# Patient Record
Sex: FEMALE | Race: WHITE | Employment: OTHER | URBAN - METROPOLITAN AREA
[De-identification: names, ages, dates, MRNs, and addresses within clinical notes are randomized per-mention and may not be internally consistent; named-entity substitution may affect disease eponyms.]

---

## 2017-06-05 PROBLEM — I10 ESSENTIAL HYPERTENSION: Status: ACTIVE | Noted: 2017-06-05

## 2017-06-05 PROBLEM — K21.9 GASTROESOPHAGEAL REFLUX DISEASE: Status: ACTIVE | Noted: 2017-06-05

## 2017-06-05 PROBLEM — E55.9 VITAMIN D DEFICIENCY: Status: ACTIVE | Noted: 2017-06-05

## 2019-06-20 ENCOUNTER — TELEPHONE (OUTPATIENT)
Dept: CARDIOLOGY | Age: 64
End: 2019-06-20

## 2019-06-20 NOTE — TELEPHONE ENCOUNTER
TRIED UNSUCCESSFULLY TO REACH PATIENT. WILL CALL DR. POOLE'S OFFICE TO OBTAIN A DIFFERENT PHONE NUMBER POSSIBLY.

## 2019-07-31 ENCOUNTER — HOSPITAL ENCOUNTER (OUTPATIENT)
Dept: CARDIOLOGY | Age: 64
Discharge: HOME | End: 2019-07-31
Payer: MEDICARE

## 2019-07-31 DIAGNOSIS — I27.20 PULMONARY HTN (HCC): ICD-10-CM

## 2019-07-31 LAB
LV EF: 65 %
LVEF MODALITY: NORMAL

## 2019-07-31 PROCEDURE — 93306 TTE W/DOPPLER COMPLETE: CPT

## 2021-02-11 ENCOUNTER — IMMUNIZATION (OUTPATIENT)
Dept: PRIMARY CARE CLINIC | Age: 66
End: 2021-02-11
Payer: MEDICARE

## 2021-02-11 PROCEDURE — 91300 COVID-19, PFIZER VACCINE 30MCG/0.3ML DOSE: CPT | Performed by: NURSE PRACTITIONER

## 2021-02-11 PROCEDURE — 0001A COVID-19, PFIZER VACCINE 30MCG/0.3ML DOSE: CPT | Performed by: NURSE PRACTITIONER

## 2021-03-04 ENCOUNTER — IMMUNIZATION (OUTPATIENT)
Dept: PRIMARY CARE CLINIC | Age: 66
End: 2021-03-04
Payer: MEDICARE

## 2021-03-04 PROCEDURE — 0002A COVID-19, PFIZER VACCINE 30MCG/0.3ML DOSE: CPT | Performed by: NURSE PRACTITIONER

## 2021-03-04 PROCEDURE — 91300 COVID-19, PFIZER VACCINE 30MCG/0.3ML DOSE: CPT | Performed by: NURSE PRACTITIONER

## 2022-02-23 ENCOUNTER — OFFICE VISIT (OUTPATIENT)
Dept: RHEUMATOLOGY | Age: 67
End: 2022-02-23
Payer: MEDICARE

## 2022-02-23 VITALS
RESPIRATION RATE: 16 BRPM | HEIGHT: 63 IN | WEIGHT: 129 LBS | OXYGEN SATURATION: 99 % | BODY MASS INDEX: 22.86 KG/M2 | SYSTOLIC BLOOD PRESSURE: 110 MMHG | DIASTOLIC BLOOD PRESSURE: 70 MMHG

## 2022-02-23 DIAGNOSIS — I73.00 RAYNAUD'S PHENOMENON WITHOUT GANGRENE: ICD-10-CM

## 2022-02-23 DIAGNOSIS — Z51.81 MEDICATION MONITORING ENCOUNTER: ICD-10-CM

## 2022-02-23 DIAGNOSIS — M34.9 LIMITED SCLERODERMA (HCC): Primary | ICD-10-CM

## 2022-02-23 DIAGNOSIS — E78.2 MIXED HYPERLIPIDEMIA: ICD-10-CM

## 2022-02-23 DIAGNOSIS — I25.10 CORONARY ARTERY DISEASE INVOLVING NATIVE CORONARY ARTERY OF NATIVE HEART WITHOUT ANGINA PECTORIS: ICD-10-CM

## 2022-02-23 DIAGNOSIS — M34.9 LIMITED SCLERODERMA (HCC): ICD-10-CM

## 2022-02-23 DIAGNOSIS — I10 ESSENTIAL HYPERTENSION: ICD-10-CM

## 2022-02-23 LAB — SEDIMENTATION RATE, ERYTHROCYTE: 32 MM/HR (ref 0–20)

## 2022-02-23 PROCEDURE — G8484 FLU IMMUNIZE NO ADMIN: HCPCS | Performed by: INTERNAL MEDICINE

## 2022-02-23 PROCEDURE — 99205 OFFICE O/P NEW HI 60 MIN: CPT | Performed by: INTERNAL MEDICINE

## 2022-02-23 PROCEDURE — 1036F TOBACCO NON-USER: CPT | Performed by: INTERNAL MEDICINE

## 2022-02-23 PROCEDURE — G8427 DOCREV CUR MEDS BY ELIG CLIN: HCPCS | Performed by: INTERNAL MEDICINE

## 2022-02-23 PROCEDURE — 3017F COLORECTAL CA SCREEN DOC REV: CPT | Performed by: INTERNAL MEDICINE

## 2022-02-23 PROCEDURE — 1123F ACP DISCUSS/DSCN MKR DOCD: CPT | Performed by: INTERNAL MEDICINE

## 2022-02-23 PROCEDURE — G8420 CALC BMI NORM PARAMETERS: HCPCS | Performed by: INTERNAL MEDICINE

## 2022-02-23 PROCEDURE — 1090F PRES/ABSN URINE INCON ASSESS: CPT | Performed by: INTERNAL MEDICINE

## 2022-02-23 PROCEDURE — G8400 PT W/DXA NO RESULTS DOC: HCPCS | Performed by: INTERNAL MEDICINE

## 2022-02-23 PROCEDURE — 4040F PNEUMOC VAC/ADMIN/RCVD: CPT | Performed by: INTERNAL MEDICINE

## 2022-02-23 RX ORDER — HYDROXYCHLOROQUINE SULFATE 200 MG/1
200 TABLET, FILM COATED ORAL DAILY
COMMUNITY

## 2022-02-23 ASSESSMENT — ENCOUNTER SYMPTOMS
ABDOMINAL PAIN: 0
TROUBLE SWALLOWING: 0
DIARRHEA: 0
COLOR CHANGE: 1
COUGH: 0
NAUSEA: 0
VOMITING: 0
SHORTNESS OF BREATH: 1

## 2022-02-23 NOTE — PROGRESS NOTES
Kaylie Cobb 1955 is a 74 y.o. 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 )         ASSESSMENT/PLAN:    Kaylie Cobb 1955 is a 74 y.o. female seen in consult for Raynaud's and possibly limited scleroderma. 1. 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 She does have pretty significant Raynaud's which is relatively well controlled on nifedipine 30 mg twice daily but, does have a history of right fourth digit ulceration with some tissue loss. She does get some heartburn and rare trouble swallowing. She has some sclerodactyly but only distal to the DIP joints. She has some telangiectasias on the face. She has not had calcinosis. She has had some shortness of breath of late. She has no known history of ILD or pulmonary hypertension. I will need to get further lab work-up as below but pending further work-up I anticipate we will ultimately want high resolution CT scan of the chest to evaluate for ILD, echocardiogram to evaluate for pulmonary hypertension, and GI referral. She likely has some component of inflammatory arthritis as well. She can continue Plaquenil 200 mg daily. 2. Medication monitoring-she is overdue for a Plaquenil eye exam.    3. CAD status post stenting-she will be making an appointment with her cardiologist as she has been having more shortness of breath lately. Would avoid systemic NSAIDs. 4. Hyperlipidemia-patient's with systemic connective tissue diseases have an increased cardiovascular risk. She is already on a statin. 5. Hypertension-she is on nifedipine which also doubles as a treatment for Raynaud's. 1. Limited scleroderma ([facility] [address]  -     Miscellaneous sendout 3; Future  -     Miscellaneous Sendout; Future  -     PATRICIA; Future  -     C3 Complement; Future  -     C4 Complement; Future  -     Sjogren's Ab (SS-A, SS-B);  Future  - Anti-RNP (OBDULIA Ab); Future  -     Melgar (OBDULIA) Antibody IgG; Future  -     Anti-DNA Antibody, Double-Stranded; Future  -     Urinalysis; Future  -     Anti-Scleroderma Antibody; Future  -     CK; Future  -     C-Reactive Protein; Future  -     Sedimentation Rate; Future  -     Cyclic Citrul Peptide Antibody, IgG; Future  -     Rheumatoid Factor; Future  -     Electrophoresis Protein, Serum; Future  2. Raynaud's phenomenon without gangrene  -     Miscellaneous sendout 3; Future  -     Miscellaneous Sendout; Future  -     PATRICIA; Future  -     C3 Complement; Future  -     C4 Complement; Future  -     Sjogren's Ab (SS-A, SS-B); Future  -     Anti-RNP (OBDULIA Ab); Future  -     Melgar (OBDULIA) Antibody IgG; Future  -     Anti-DNA Antibody, Double-Stranded; Future  -     Urinalysis; Future  -     Anti-Scleroderma Antibody; Future  -     CK; Future  -     C-Reactive Protein; Future  -     Sedimentation Rate; Future  -     Cyclic Citrul Peptide Antibody, IgG; Future  -     Rheumatoid Factor; Future  -     Electrophoresis Protein, Serum; Future  3. Medication monitoring encounter  4. Mixed hyperlipidemia  5. Coronary artery disease involving native coronary artery of native heart without angina pectoris  6. Essential hypertension      Return in about 4 weeks (around 3/23/2022). Subjective   SUBJECTIVE/OBJECTIVE:    HPI: Pastor Gerard 1955 is a 74 y.o. female seen in consult for Raynaud's and possibly limited scleroderma. Patient was previously following with rheumatology at the [facility]. She was last seen over a year ago. She was being treated for Raynaud's. She actually had a pretty significant digital ulcer and lost some of the tip of her right fourth finger a few years back. She is currently on nifedipine 30 mg twice daily and has not had any recent digital pitting or ulceration. There was also some question at the [facility] of crest syndrome.  She does appear to have some sclerodactyly distal to the PIP joints only. She has some telangiectasias on the face. She rarely has issues with swallowing but does get heartburn. She has not had calcinosis. She has been having a little more shortness of breath lately. She has a history of CAD status post stenting. She has no known history of interstitial lung disease or pulmonary hypertension. She does get some joint pain particularly in the hands. She is on Plaquenil 200 mg daily. She is due for an eye exam.    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 HENT: Negative for mouth sores and trouble swallowing. Respiratory: Positive for shortness of breath. Negative for cough. Cardiovascular: Negative for chest pain. Gastrointestinal: Negative for abdominal pain, diarrhea, nausea and vomiting. Genitourinary: Negative for dysuria and hematuria. Musculoskeletal: Positive for arthralgias. Negative for joint swelling. Skin: Positive for color change. Negative for rash. Neurological: Negative for weakness and numbness. Hematological: Negative for adenopathy. All other systems reviewed and are negative. Objective   Vitals:    02/23/22 1501   BP: 110/70   Resp: 16   SpO2: 99%      Physical Exam  Constitutional:       General: She is not in acute distress. Appearance: Normal appearance. HENT:      Head: Normocephalic and atraumatic. Right Ear: External ear normal.      Left Ear: External ear normal.      Nose: Nose normal.   Eyes:      General: No scleral icterus. Pulmonary:      Effort: Pulmonary effort is normal.   Musculoskeletal:         General: Tenderness present. No swelling or deformity.       Comments: She is tender in several PIP joints. Are diffusely puffy. Skin:     General: Skin is warm and dry. Findings: No rash. Comments: Fingers are puffy and there is sclerodactyly but only distal to the DIP joints. There is no obvious skin thickening elsewhere. She has evidence of tissue loss at the right fourth digit but no current digital pitting or ulceration. Neurological:      General: No focal deficit present. Mental Status: She is alert and oriented to person, place, and time. Mental status is at baseline. Psychiatric:         Mood and Affect: Mood normal.         Behavior: Behavior normal.            Lab Results   Component Value Date    WBC 11.1 09/05/2015    HGB 11.9 09/05/2015    HCT 36.7 09/05/2015    MCV 84.9 09/05/2015     09/05/2015     Lab Results   Component Value Date     09/05/2015    K 3.9 09/05/2015    CL 96 (L) 09/05/2015    CO2 23 09/05/2015    BUN 18 09/05/2015    CREATININE 0.6 09/05/2015    GLUCOSE 78 09/05/2015    CALCIUM 9.5 09/05/2015    PROT 7.3 09/05/2015    LABALBU 4.1 09/05/2015    BILITOT 0.2 09/05/2015    ALKPHOS 68 09/05/2015    AST 23 09/05/2015    ALT 10 09/05/2015    LABGLOM >60 09/05/2015    GFRAA >60 09/05/2015     No results found for: PATRICIA  No results found for: RHEUMFACTOR  Lab Results   Component Value Date    SEDRATE 52 (H) 09/05/2015     No results found for: CRP         An electronic signature was used to authenticate this note. This note was generated with a voice recognition dictation system. Please disregard any errors or omission which have escaped my review.     --Reji Ngo, DO

## 2022-02-23 NOTE — PATIENT INSTRUCTIONS
I suspect we are indeed dealing with CREST syndrome (limited scleroderma) but will need further workup    No medication changes for now    See eye doctor    May need CT of chest and echo pending workup

## 2022-02-23 NOTE — LETTER
[address] [facility]  Phone: [phone]  Fax: [address],     February 23, 2022     Emmie Sanchez MD  [address] [place] [address] Drive [address] [place] [id number]    Patient: Gina Holland   MR Number: <[id number]>   YOB: 1955   Date of Visit: 2/23/2022       Dear Emmie Sanchez:    Thank you for referring Jet Chawlailor to me for evaluation/treatment. Below are the relevant portions of my assessment and plan of care. ASSESSMENT/PLAN:    Gina Holland 1955 is a 74 y.o. female seen in consult for Raynaud's and possibly limited scleroderma. 1. 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 She does have pretty significant Raynaud's which is relatively well controlled on nifedipine 30 mg twice daily but, does have a history of right fourth digit ulceration with some tissue loss. She does get some heartburn and rare trouble swallowing. She has some sclerodactyly but only distal to the DIP joints. She has some telangiectasias on the face. She has not had calcinosis. She has had some shortness of breath of late. She has no known history of ILD or pulmonary hypertension. I will need to get further lab work-up as below but pending further work-up I anticipate we will ultimately want high resolution CT scan of the chest to evaluate for ILD, echocardiogram to evaluate for pulmonary hypertension, and GI referral. She likely has some component of inflammatory arthritis as well. She can continue Plaquenil 200 mg daily. 2. Medication monitoring-she is overdue for a Plaquenil eye exam.    3. CAD status post stenting-she will be making an appointment with her cardiologist as she has been having more shortness of breath lately. Would avoid systemic NSAIDs.     4. Hyperlipidemia-patient's with systemic connective tissue diseases have an increased cardiovascular risk. She is already on a statin. 5. Hypertension-she is on nifedipine which also doubles as a treatment for Raynaud's. 1. Limited scleroderma ([address]  -     Miscellaneous sendout 3; Future  -     Miscellaneous Sendout; Future  -     PATRICIA; Future  -     C3 Complement; Future  -     C4 Complement; Future  -     Sjogren's Ab (SS-A, SS-B); Future  -     Anti-RNP (OBDULIA Ab); Future  -     Melgar (OBDULIA) Antibody IgG; Future  -     Anti-DNA Antibody, Double-Stranded; Future  -     Urinalysis; Future  -     Anti-Scleroderma Antibody; Future  -     CK; Future  -     C-Reactive Protein; Future  -     Sedimentation Rate; Future  -     Cyclic Citrul Peptide Antibody, IgG; Future  -     Rheumatoid Factor; Future  -     Electrophoresis Protein, Serum; Future  2. Raynaud's phenomenon without gangrene  -     Miscellaneous sendout 3; Future  -     Miscellaneous Sendout; Future  -     PATRICIA; Future  -     C3 Complement; Future  -     C4 Complement; Future  -     Sjogren's Ab (SS-A, SS-B); Future  -     Anti-RNP (OBDULIA Ab); Future  -     Melgar (OBDULIA) Antibody IgG; Future  -     Anti-DNA Antibody, Double-Stranded; Future  -     Urinalysis; Future  -     Anti-Scleroderma Antibody; Future  -     CK; Future  -     C-Reactive Protein; Future  -     Sedimentation Rate; Future  -     Cyclic Citrul Peptide Antibody, IgG; Future  -     Rheumatoid Factor; Future  -     Electrophoresis Protein, Serum; Future  3. Medication monitoring encounter  4. Mixed hyperlipidemia  5. Coronary artery disease involving native coronary artery of native heart without angina pectoris  6. Essential hypertension      Return in about 4 weeks (around 3/23/2022). If you have questions, please do not hesitate to call me. I look forward to following Gearl Grew along with you.     Sincerely,      Emmanuel Walsh, DO

## 2022-02-24 LAB
ANTI DNA DOUBLE STRANDED: NEGATIVE
ANTI-NUCLEAR ANTIBODY (ANA): NEGATIVE
C-REACTIVE PROTEIN: 0.3 MG/DL (ref 0–0.4)
C3 COMPLEMENT: 134 MG/DL (ref 90–180)
C4 COMPLEMENT: 40 MG/DL (ref 10–40)
ENA TO RNP ANTIBODY: NEGATIVE
ENA TO SMITH (SM) ANTIBODY: NEGATIVE
ENA TO SSA (RO) ANTIBODY: NEGATIVE
ENA TO SSB (LA) ANTIBODY: NEGATIVE
RHEUMATOID FACTOR: 11 IU/ML (ref 0–13)
SCLERODERMA (SCL-70) AB: NEGATIVE
TOTAL CK: 89 U/L (ref 20–180)

## 2022-02-25 LAB
ALBUMIN SERPL-MCNC: 3.9 G/DL (ref 3.5–4.7)
ALPHA-1-GLOBULIN: 0.3 G/DL (ref 0.2–0.4)
ALPHA-2-GLOBULIN: 0.9 G/DL (ref 0.5–1)
BETA GLOBULIN: 1 G/DL (ref 0.8–1.3)
ELECTROPHORESIS: NORMAL
GAMMA GLOBULIN: 1.2 G/DL (ref 0.7–1.6)
TOTAL PROTEIN: 7.3 G/DL (ref 6.4–8.3)

## 2022-02-27 LAB — CYCLIC CITRULLINATED PEPTIDE ANTIBODY IGG: 1.1 U/ML (ref 0–7)

## 2022-02-28 LAB
Lab: NORMAL
Lab: NORMAL
REPORT: NORMAL
REPORT: NORMAL
THIS TEST SENT TO: NORMAL
THIS TEST SENT TO: NORMAL

## 2022-03-08 DIAGNOSIS — M34.9 LIMITED SCLERODERMA (HCC): ICD-10-CM

## 2022-03-08 DIAGNOSIS — I73.00 RAYNAUD'S PHENOMENON WITHOUT GANGRENE: ICD-10-CM

## 2022-03-08 LAB
BACTERIA: NORMAL /HPF
BILIRUBIN URINE: NEGATIVE
BLOOD, URINE: NORMAL
CLARITY: CLEAR
COLOR: YELLOW
GLUCOSE URINE: NEGATIVE MG/DL
KETONES, URINE: NEGATIVE MG/DL
LEUKOCYTE ESTERASE, URINE: NEGATIVE
NITRITE, URINE: NEGATIVE
PH UA: 6.5 (ref 5–9)
PROTEIN UA: NEGATIVE MG/DL
RBC UA: NORMAL /HPF (ref 0–2)
SPECIFIC GRAVITY UA: 1.01 (ref 1–1.03)
UROBILINOGEN, URINE: 0.2 E.U./DL
WBC UA: NORMAL /HPF (ref 0–5)

## 2022-03-08 PROCEDURE — 81003 URINALYSIS AUTO W/O SCOPE: CPT | Performed by: INTERNAL MEDICINE

## 2022-03-21 ENCOUNTER — OFFICE VISIT (OUTPATIENT)
Dept: RHEUMATOLOGY | Age: 67
End: 2022-03-21
Payer: MEDICARE

## 2022-03-21 VITALS
HEIGHT: 62 IN | BODY MASS INDEX: 23.92 KG/M2 | HEART RATE: 63 BPM | DIASTOLIC BLOOD PRESSURE: 62 MMHG | RESPIRATION RATE: 16 BRPM | OXYGEN SATURATION: 98 % | WEIGHT: 130 LBS | SYSTOLIC BLOOD PRESSURE: 106 MMHG

## 2022-03-21 DIAGNOSIS — I10 ESSENTIAL HYPERTENSION: ICD-10-CM

## 2022-03-21 DIAGNOSIS — E78.2 MIXED HYPERLIPIDEMIA: ICD-10-CM

## 2022-03-21 DIAGNOSIS — I25.10 CORONARY ARTERY DISEASE INVOLVING NATIVE CORONARY ARTERY OF NATIVE HEART WITHOUT ANGINA PECTORIS: ICD-10-CM

## 2022-03-21 DIAGNOSIS — M34.9 LIMITED SCLERODERMA (HCC): Primary | ICD-10-CM

## 2022-03-21 DIAGNOSIS — I73.00 RAYNAUD'S PHENOMENON WITHOUT GANGRENE: ICD-10-CM

## 2022-03-21 PROCEDURE — 99215 OFFICE O/P EST HI 40 MIN: CPT | Performed by: INTERNAL MEDICINE

## 2022-03-21 ASSESSMENT — ENCOUNTER SYMPTOMS
TROUBLE SWALLOWING: 0
COUGH: 0
DIARRHEA: 0
NAUSEA: 0
VOMITING: 0
SHORTNESS OF BREATH: 1
COLOR CHANGE: 1
ABDOMINAL PAIN: 0

## 2022-03-21 NOTE — PROGRESS NOTES
Gina Holland 1955 is a 74 y.o. female, here for evaluation of the following chief complaint(s):  Follow-up (Patient here for a 4 week follow up on scleroderma. )         ASSESSMENT/PLAN:    Gina Holland 1955 is a 74 y.o. 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 1. Limited scleroderma-serologic work-up has been unrevealing including negative PATRICIA however, clinical picture is consistent with limited scleroderma/crest. She does have pretty significant Raynaud's which is relatively well controlled on nifedipine 30 mg twice daily but, does have a history of right fourth digit ulceration with some tissue loss. She does get some heartburn and rare trouble swallowing. She has some sclerodactyly but only distal to the DIP joints. She has some telangiectasias on the face and hands. She has not had calcinosis. She has had some shortness of breath of late. She has no known history of ILD or pulmonary hypertension. She just saw cardiology and had echocardiogram to evaluate for pulmonary hypertension. I will get a high-resolution CT of the chest to evaluate for ILD. She likely has some component of inflammatory arthritis as well. She can continue Plaquenil 200 mg daily. 2. Medication monitoring-she is up-to-date on Plaquenil eye exam.    3. CAD status post stenting-she had a recent appointment with her cardiologist who did an echocardiogram.  We will see if we can get that record. Would avoid systemic NSAIDs. 4. Hyperlipidemia-patient's with systemic connective tissue diseases have an increased cardiovascular risk. She is already on a statin. 5. Hypertension-she is on nifedipine which also doubles as a treatment for Raynaud's. 1. Limited scleroderma ([address]  -     CT CHEST HIGH RESOLUTION; Future  2. Raynaud's phenomenon without gangrene  3. Coronary artery disease involving native coronary artery of native heart without angina pectoris  4.  Mixed hyperlipidemia  5. Essential hypertension      Return in about 3 months (around 6/21/2022). Subjective   SUBJECTIVE/OBJECTIVE:    HPI: Prudencio Martinez 1955 is a 74 y.o. female seen in consult for Raynaud's and possibly limited scleroderma. Patient was previously following with rheumatology at the [facility] clinic. She was last seen over a year ago. She was being treated for Raynaud's. She actually had a pretty significant digital ulcer and lost some of the tip of her right fourth finger a few years back. She is currently on nifedipine 30 mg twice daily and has not had any recent digital pitting or ulceration. There was also some question at the [facility] clinic of crest syndrome. She does appear to have some sclerodactyly distal to the PIP joints only. She has some telangiectasias on the face. She rarely has issues with swallowing but does get heartburn. She has not had calcinosis. She has been having a little more shortness of breath lately. She has a history of CAD status post stenting. She has no known history of interstitial lung disease or pulmonary hypertension. She does get some joint pain particularly in the hands. She is on Plaquenil 200 mg daily. She is due for an eye exam.    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 HENT: Negative for mouth sores and trouble swallowing. Respiratory: Positive for shortness of breath. Negative for cough. Cardiovascular: Negative for chest pain. Gastrointestinal: Negative for abdominal pain, diarrhea, nausea and vomiting. Genitourinary: Negative for dysuria and hematuria. Musculoskeletal: Positive for arthralgias.  Negative for joint swelling. Skin: Positive for color change. Negative for rash. Neurological: Negative for weakness and numbness. Hematological: Negative for adenopathy. All other systems reviewed and are negative. Objective   Vitals:    03/21/22 1425   BP: 106/62   Pulse: 63   Resp: 16   SpO2: 98%      Physical Exam  Constitutional:       General: She is not in acute distress. Appearance: Normal appearance. HENT:      Head: Normocephalic and atraumatic. Right Ear: External ear normal.      Left Ear: External ear normal.      Nose: Nose normal.   Eyes:      General: No scleral icterus. Pulmonary:      Effort: Pulmonary effort is normal.   Musculoskeletal:         General: Tenderness present. No swelling or deformity. Comments: She is tender in several PIP joints. Are diffusely puffy. Skin:     General: Skin is warm and dry. Findings: No rash. Comments: Fingers are puffy and there is sclerodactyly but only distal to the DIP joints. There is no obvious skin thickening elsewhere. She has evidence of tissue loss at the right fourth digit but no current digital pitting or ulceration. Neurological:      General: No focal deficit present. Mental Status: She is alert and oriented to person, place, and time. Mental status is at baseline.    Psychiatric:         Mood and Affect: Mood normal.         Behavior: Behavior normal.            Lab Results   Component Value Date    WBC 11.1 09/05/2015    HGB 11.9 09/05/2015    HCT 36.7 09/05/2015    MCV 84.9 09/05/2015     09/05/2015     Lab Results   Component Value Date     09/05/2015    K 3.9 09/05/2015    CL 96 (L) 09/05/2015    CO2 23 09/05/2015    BUN 18 09/05/2015    CREATININE 0.6 09/05/2015    GLUCOSE 78 09/05/2015    CALCIUM 9.5 09/05/2015    PROT 7.3 02/23/2022    LABALBU 3.9 02/23/2022    BILITOT 0.2 09/05/2015    ALKPHOS 68 09/05/2015    AST 23 09/05/2015    ALT 10 09/05/2015    LABGLOM >60 09/05/2015    GFRAA >60 09/05/2015     Lab Results   Component Value Date    PATRICIA NEGATIVE 02/23/2022     No results found for: RHEUMFACTOR  Lab Results   Component Value Date    SEDRATE 32 (H) 02/23/2022     Lab Results   Component Value Date    CRP 0.3 02/23/2022            An electronic signature was used to authenticate this note. This note was generated with a voice recognition dictation system. Please disregard any errors or omission which have escaped my review.     --Lew Ochoa, DO

## 2022-03-21 NOTE — LETTER
[address] [facility]  Phone: [phone]  Fax: [address],     March 21, 2022     Sue Cohen MD  [address] [place] [address] Drive [address] [place] [id number]    Patient: Mathew Saravia   MR Number: [id number]   YOB: 1955   Date of Visit: 3/21/2022       Dear Sue Cohen:    Thank you for referring Radha Saldana to me for evaluation/treatment. Below are the relevant portions of my assessment and plan of care. ASSESSMENT/PLAN:    Mathew Saravia 1955 is a 74 y.o. 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 1. Limited scleroderma-serologic work-up has been unrevealing including negative PATRICIA however, clinical picture is consistent with limited scleroderma/crest. She does have pretty significant Raynaud's which is relatively well controlled on nifedipine 30 mg twice daily but, does have a history of right fourth digit ulceration with some tissue loss. She does get some heartburn and rare trouble swallowing. She has some sclerodactyly but only distal to the DIP joints. She has some telangiectasias on the face and hands. She has not had calcinosis. She has had some shortness of breath of late. She has no known history of ILD or pulmonary hypertension. She just saw cardiology and had echocardiogram to evaluate for pulmonary hypertension. I will get a high-resolution CT of the chest to evaluate for ILD. She likely has some component of inflammatory arthritis as well. She can continue Plaquenil 200 mg daily. 2. Medication monitoring-she is up-to-date on Plaquenil eye exam.    3. CAD status post stenting-she had a recent appointment with her cardiologist who did an echocardiogram.  We will see if we can get that record. Would avoid systemic NSAIDs. 4. Hyperlipidemia-patient's with systemic connective tissue diseases have an increased cardiovascular risk.  She is

## 2022-04-07 ENCOUNTER — HOSPITAL ENCOUNTER (OUTPATIENT)
Dept: CT IMAGING | Age: 67
Discharge: HOME | End: 2022-04-09
Payer: MEDICARE

## 2022-04-07 DIAGNOSIS — M34.9 LIMITED SCLERODERMA (HCC): ICD-10-CM

## 2022-04-07 PROCEDURE — 71250 CT THORAX DX C-: CPT

## 2022-05-16 ENCOUNTER — TELEPHONE (OUTPATIENT)
Dept: RHEUMATOLOGY | Age: 67
End: 2022-05-16

## 2022-06-21 ENCOUNTER — OFFICE VISIT (OUTPATIENT)
Dept: RHEUMATOLOGY | Age: 67
End: 2022-06-21
Payer: MEDICARE

## 2022-06-21 VITALS
SYSTOLIC BLOOD PRESSURE: 106 MMHG | BODY MASS INDEX: 21.97 KG/M2 | HEIGHT: 63 IN | WEIGHT: 124 LBS | DIASTOLIC BLOOD PRESSURE: 64 MMHG | RESPIRATION RATE: 16 BRPM

## 2022-06-21 DIAGNOSIS — M34.9 LIMITED SCLERODERMA (HCC): Primary | ICD-10-CM

## 2022-06-21 DIAGNOSIS — E78.2 MIXED HYPERLIPIDEMIA: ICD-10-CM

## 2022-06-21 DIAGNOSIS — M81.0 AGE-RELATED OSTEOPOROSIS WITHOUT CURRENT PATHOLOGICAL FRACTURE: ICD-10-CM

## 2022-06-21 DIAGNOSIS — I10 ESSENTIAL HYPERTENSION: ICD-10-CM

## 2022-06-21 DIAGNOSIS — I25.10 CORONARY ARTERY DISEASE INVOLVING NATIVE CORONARY ARTERY OF NATIVE HEART WITHOUT ANGINA PECTORIS: ICD-10-CM

## 2022-06-21 DIAGNOSIS — Z79.899 HIGH RISK MEDICATION USE: ICD-10-CM

## 2022-06-21 PROCEDURE — 1123F ACP DISCUSS/DSCN MKR DOCD: CPT | Performed by: INTERNAL MEDICINE

## 2022-06-21 PROCEDURE — 99215 OFFICE O/P EST HI 40 MIN: CPT | Performed by: INTERNAL MEDICINE

## 2022-06-21 ASSESSMENT — ENCOUNTER SYMPTOMS
SHORTNESS OF BREATH: 0
COLOR CHANGE: 1
VOMITING: 0
DIARRHEA: 0
NAUSEA: 0
ABDOMINAL PAIN: 0
COUGH: 0
TROUBLE SWALLOWING: 0

## 2022-06-21 NOTE — PROGRESS NOTES
Franci Cuevas 1955 is a 69 y.o. female, here for evaluation of the following chief complaint(s):  Follow-up (3 month follow up scleroderma-no conerns )         ASSESSMENT/PLAN:    Franci Cuevas 1955 is a 69 y.o. 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 1. Limited scleroderma-serologic work-up has been unrevealing including negative PATRICIA however, clinical picture is consistent with limited scleroderma/crest. She does have pretty significant Raynaud's which is relatively well controlled on nifedipine 30 mg twice daily but, does have a history of right fourth digit ulceration with some tissue loss. She does get some heartburn and rare trouble swallowing. She has some sclerodactyly but only distal to the PIP joints. She has some telangiectasias on the face and hands. She has not had calcinosis. Resolution CT scan showed no evidence of ILD from April 2022. She saw cardiology and had echocardiogram to evaluate for pulmonary hypertension. Apparently the results were normal but have not been sent to me so I will request those. She likely has some component of inflammatory arthritis as well which now seems to be well controlled. She can continue Plaquenil 200 mg daily. 2. Medication monitoring-she is up-to-date on Plaquenil eye exam.  We will update basic blood work today. 3. CAD status post stenting-she had a recent appointment with her cardiologist who did an echocardiogram.  We will see if we can get that record. Would avoid systemic NSAIDs. 4. Hyperlipidemia-patient's with systemic connective tissue diseases have an increased cardiovascular risk. She is already on a statin. 5. Hypertension-she is on nifedipine which also doubles as a treatment for Raynaud's. 6.  Osteoporosis-with esophageal dysmotility and scleroderma would avoid oral bisphosphonates. She will be having some dental work next month.   After that we will plan on starting her on Prolia. Next bone density scan will be June 2024.    1. Limited scleroderma ([address]  -     CBC with Auto Differential; Future  -     Comprehensive Metabolic Panel; Future  -     C-Reactive Protein; Future  -     Sedimentation Rate; Future  -     Urinalysis; Future  2. High risk medication use  3. Mixed hyperlipidemia  4. Age-related osteoporosis without current pathological fracture  5. Essential hypertension  6. Coronary artery disease involving native coronary artery of native heart without angina pectoris      Return in about 6 months (around 12/21/2022). Subjective   SUBJECTIVE/OBJECTIVE:    HPI: Taylor Sommers 1955 is a 69 y.o. female seen in follow-up for Raynaud's and limited scleroderma. Since last visit she had CT scan of the chest which showed no evidence of ILD. She also saw cardiology and had an echocardiogram but I do not have that record. She also had a bone density scan since last visit which was consistent with osteoporosis. Otherwise she states that things are essentially unchanged. She has not had any progression of skin thickening. Her Raynaud's is pretty much unchanged. No current digital pitting or ulceration. She has very rare trouble swallowing. She takes Tums. She denies fever, rash, chest pain, shortness of breath. Initial history: Patient was previously following with rheumatology at the [place] [facility]. She was last seen over a year ago. She was being treated for Raynaud's. She actually had a pretty significant digital ulcer and lost some of the tip of her right fourth finger a few years back. She is currently on nifedipine 30 mg twice daily and has not had any recent digital pitting or ulceration. There was also some question at the [place] [facility] of crest syndrome. She does appear to have some sclerodactyly distal to the PIP joints only. She has some telangiectasias on the face. She rarely has issues with swallowing but does get heartburn.  She has not had calcinosis. She has been having a little more shortness of breath lately. She has a history of CAD status post stenting. She has no known history of interstitial lung disease or pulmonary hypertension. She does get some joint pain particularly in the hands. She is on Plaquenil 200 mg daily. She is due for an eye exam.    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 HENT: Negative for mouth sores and trouble swallowing. Respiratory: Negative for cough and shortness of breath. Cardiovascular: Negative for chest pain. Gastrointestinal: Negative for abdominal pain, diarrhea, nausea and vomiting. Genitourinary: Negative for dysuria and hematuria. Musculoskeletal: Positive for arthralgias. Negative for joint swelling. Skin: Positive for color change. Negative for rash. Neurological: Negative for weakness and numbness. Hematological: Negative for adenopathy. All other systems reviewed and are negative. Objective   Vitals:    06/21/22 1305   BP: 106/64   Resp: 16      Physical Exam  Constitutional:       General: She is not in acute distress. Appearance: Normal appearance. HENT:      Head: Normocephalic and atraumatic. Right Ear: External ear normal.      Left Ear: External ear normal.      Nose: Nose normal.   Eyes:      General: No scleral icterus. Pulmonary:      Effort: Pulmonary effort is normal.   Musculoskeletal:         General: No swelling, tenderness or deformity. Comments: No evidence of synovitis on exam.   Skin:     General: Skin is warm and dry. Findings: No rash.       Comments: Fingers are puffy and there is sclerodactyly but only distal to the PIP joints. There is no obvious skin thickening elsewhere. She has evidence of tissue loss at the right fourth digit but no current digital pitting or ulceration. She has telangiectasias. Neurological:      General: No focal deficit present. Mental Status: She is alert and oriented to person, place, and time. Mental status is at baseline. Psychiatric:         Mood and Affect: Mood normal.         Behavior: Behavior normal.            Lab Results   Component Value Date    WBC 11.1 09/05/2015    HGB 11.9 09/05/2015    HCT 36.7 09/05/2015    MCV 84.9 09/05/2015     09/05/2015     Lab Results   Component Value Date     09/05/2015    K 3.9 09/05/2015    CL 96 (L) 09/05/2015    CO2 23 09/05/2015    BUN 18 09/05/2015    CREATININE 0.6 09/05/2015    GLUCOSE 78 09/05/2015    CALCIUM 9.5 09/05/2015    PROT 7.3 02/23/2022    LABALBU 3.9 02/23/2022    BILITOT 0.2 09/05/2015    ALKPHOS 68 09/05/2015    AST 23 09/05/2015    ALT 10 09/05/2015    LABGLOM >60 09/05/2015    GFRAA >60 09/05/2015     Lab Results   Component Value Date    PATRICIA NEGATIVE 02/23/2022     No results found for: RHEUMFACTOR  Lab Results   Component Value Date    SEDRATE 32 (H) 02/23/2022     Lab Results   Component Value Date    CRP 0.3 02/23/2022            An electronic signature was used to authenticate this note. This note was generated with a voice recognition dictation system. Please disregard any errors or omission which have escaped my review.     --Zelalem Hinson DO

## 2022-06-21 NOTE — PATIENT INSTRUCTIONS
No medication changes for now but will plan on Prolia after dental work      Patient Education        denosumab (Prolia)  Pronunciation: rashad hancock  Brand: Prolia  What is the most important information I should know about Prolia? This medication guide provides information about the Prolia brand of denosumab. [employer] is another brand of denosumab used to prevent bone fractures and otherskeletal conditions in people with tumors that have spread to the bone. Prolia can cause many serious side effects. Call your doctor at once if you have a fever, chills, pain or burning when you urinate, severe stomach pain, cough, shortness of breath, skin problems,numbness or tingling, severe or unusual pain, or skin problems. Do not use if you are pregnant. Use effective birth control while using Prolia, and for at least 5 monthsafter you stop. What is denosumab (Prolia)? Denosumab is a monoclonal antibody. Monoclonal antibodies are made to target and destroy only certain cells in the body. This may help to protect healthycells from damage. The Prolia brand of denosumab is used to treat osteoporosis or bone loss in men and women who have a high risk of bone fracture. Prolia is sometimes used in people whosebone fracture is caused by certain medicines or cancer treatments. This medication guide provides information about the Prolia brand of denosumab. Pretty [employer]me is another brand of denosumab used to prevent bone fractures and otherskeletal conditions in people with tumors that have spread to the bone. Denosumab may also be used for purposes not listed in this medication guide. What should I discuss with my healthcare provider before receiving Prolia? You should not receive Prolia if you are allergic to denosumab, or if you have:   low levels of calcium in your blood (hypocalcemia); or   if you are pregnant. While you are using Prolia, you should not receive Xgeva, another brand of denosumab.   Tell your doctor if you have ever had:   kidney disease (or if you are on dialysis);   a weak immune system (caused by disease or by using certain medicines);   hypoparathyroidism (decreased functioning of the parathyroid glands);   thyroid surgery;   any condition that makes it hard for your body to absorb nutrients from food (malabsorption);   a latex allergy;   if you are scheduled for a dental procedure; or   if you cannot take daily calcium and vitamin D. Denosumab may cause bone loss (osteonecrosis) in the jaw. Symptoms include jaw pain or numbness, red or swollen gums, loose teeth, guminfection, or slow healing after dental work. The risk of osteonecrosis is highest in people with cancer, blood cell disorders, pre-existing dental problems, or people treated with steroids,chemotherapy, or radiation. Ask your doctor about your own risk. You may need to have a negative pregnancy test before starting this treatment. Do not use Prolia if you are pregnant. It could harm the unborn baby or cause birth defects. Use effective birth control to prevent pregnancy while you are using this medicine and for at least 5 months after your last dose. Tell your doctor right away if you becomepregnant. You should not breastfeed while using denosumab. How is Prolia given? Denosumab is injected under the skin of your stomach, upper thigh, or upperarm. A healthcare provider will give you this injection. Prolia is usually given once every 6 months. Your doctor may have you take extra calcium and vitamin D while you are being treated with denosumab. Take only the amount of calcium and vitamin D that yourdoctor has prescribed. If you need to have any dental work (especially surgery), tell the dentistahead of time that you are receiving denosumab. Pay special attention to your dental hygiene. Brush and floss your teeth regularly while receiving this medication. You may need to have a dental exambefore you begin treatment with Prolia.  Follow your doctor's instructions. Your risk of bone fractures can increase when you stop using Prolia. Do not stop using this medicine without first talking to your doctor. If you keep this medicine at home, store it in the original carton in a refrigerator. Protect from light and do not freeze. Do not shake the prefilled syringe. You may take the carton out of the refrigerator and allow it to reach roomtemperature before the injection is given. After you have taken Prolia out of the refrigerator, you may keep it at room temperature for up to 14 days. Store in the original container away from [place]. Each prefilled syringe is for one use only. Throw it away after one use, evenif there is still medicine left inside. Use a needle and syringe only once and then place them in a puncture-proof \"sharps\" container. Follow state or local laws about how to dispose of thiscontainer. Keep it out of the reach of children and pets. Do not share this medicine with another person, even if they have the same symptoms you have. What happens if I miss a dose? Call your doctor for instructions if you miss a dose or miss an appointment for your Prolia injection. You should receive your missed injection as soon aspossible. What happens if I overdose? Seek emergency medical attention or call the Poison Help line at 1-427.576.9001. What should I avoid while receiving Prolia? Follow your doctor's instructions about any restrictions on food, beverages, oractivity. What are the possible side effects of Prolia? Get emergency medical help if you have signs of an allergic reaction: hives, itching, rash; difficult breathing, feeling light-headed; swelling ofyour face, lips, tongue, or throat.   Call your doctor at once if you have:   new or unusual pain in your thigh, hip, or groin;   severe pain in your joints, muscles, or bones;   skin problems such as dryness, peeling, redness, itching, blisters, bumps, oozing, or crusting; or   low calcium level --muscle spasms or contractions, numbness or tingly feeling (around your mouth, or in your fingers and toes). Serious infections may occur during treatment with Prolia. Call your doctor right away if you have signs of infection such as:   fever, chills, night sweats;   swelling, pain, tenderness, warmth, or redness anywhere on your body;   pain or burning when you urinate;   increased or urgent need to urinate;   severe stomach pain; or   cough, wheezing, feeling short of breath. Common side effects may include:   bladder infection (painful or difficult urination);   lung infection (cough, shortness of breath);   headache;   back pain, muscle pain, joint pain;   increased blood pressure;   cold symptoms such as stuffy nose, sneezing, sore throat;   high cholesterol; or   pain in your arms or legs. This is not a complete list of side effects and others may occur. Call your doctor for medical advice about side effects. You may report side effects toFDA at [phone]-[phone]. What other drugs will affect Prolia? Other drugs may affect Prolia, including prescription and over-the-counter medicines, vitamins, and herbal products. Tell your doctor about all yourcurrent medicines and any medicine you start or stop using. Where can I get more information? Your doctor or pharmacist can provide more information about denosumab (Prolia). Remember, keep this and all other medicines out of the reach of children, never share your medicines with others, and use this medication only for the indication prescribed. Every effort has been made to ensure that the information provided by [address] is accurate, up-to-date, and complete, but no guarantee is made to that effect. Drug information contained herein may be time sensitive.  [employer] information has been compiled for use by healthcare practitioners and consumers in the [facility] and therefore [employer] does not warrant that uses outside of the [place] are appropriate, unless specifically indicated otherwise. [facility]'s drug information does not endorse drugs, diagnose patients or recommend therapy. [facility] drug information is an informational resource designed to assist licensed healthcare practitioners in caring for their patients and/or to serve consumers viewing this service as a supplement to, and not a substitute for, the expertise, skill, knowledge and judgment of healthcare practitioners. The absence of a warning for a given drug or drug combination in no way should be construed to indicate that the drug or drug combination is safe, effective or appropriate for any given patient. [facility] does not assume any responsibility for any aspect of healthcare administered with the aid of information [facility] provides. The information contained herein is not intended to cover all possible uses, directions, precautions, warnings, drug interactions, allergic reactions, or adverse effects. If you have questions about the drugs you are taking, check with yourdoctor, nurse or pharmacist.  Copyright 9283-6901 [address]: 12.01. Revision date:2/18/2020. Care instructions adapted under license by [facility] ([facility]). If you have questions about a medical condition or this instruction, always ask your healthcare professional. [place] [address] any warranty or liability for your use of this information.

## 2023-02-02 ENCOUNTER — HOSPITAL ENCOUNTER (OUTPATIENT)
Dept: NUCLEAR MEDICINE | Age: 68
Discharge: HOME | End: 2023-02-02
Payer: MEDICARE

## 2023-02-02 ENCOUNTER — HOSPITAL ENCOUNTER (OUTPATIENT)
Dept: NON INVASIVE DIAGNOSTICS | Age: 68
Discharge: HOME | End: 2023-02-02
Payer: MEDICARE

## 2023-02-02 VITALS — BODY MASS INDEX: 21.26 KG/M2 | WEIGHT: 120 LBS | HEIGHT: 63 IN

## 2023-02-02 LAB
LV EF: 72 %
LVEF MODALITY: NORMAL

## 2023-02-02 PROCEDURE — 93017 CV STRESS TEST TRACING ONLY: CPT

## 2023-02-02 PROCEDURE — 3430000000 HC RX DIAGNOSTIC RADIOPHARMACEUTICAL: Performed by: RADIOLOGY

## 2023-02-02 PROCEDURE — 78452 HT MUSCLE IMAGE SPECT MULT: CPT

## 2023-02-02 PROCEDURE — A9500 TC99M SESTAMIBI: HCPCS | Performed by: RADIOLOGY

## 2023-02-02 PROCEDURE — 6360000002 HC RX W HCPCS: Performed by: INTERNAL MEDICINE

## 2023-02-02 RX ORDER — TECHNETIUM TC-99M SESTAMIBI 1 MG/10ML
10 INJECTION INTRAVENOUS
Status: COMPLETED | OUTPATIENT
Start: 2023-02-02 | End: 2023-02-02

## 2023-02-02 RX ORDER — TECHNETIUM TC-99M SESTAMIBI 1 MG/10ML
30 INJECTION INTRAVENOUS
Status: COMPLETED | OUTPATIENT
Start: 2023-02-02 | End: 2023-02-02

## 2023-02-02 RX ADMIN — REGADENOSON 0.4 MG: 0.08 INJECTION, SOLUTION INTRAVENOUS at 11:20

## 2023-02-02 RX ADMIN — Medication 10 MILLICURIE: at 10:02

## 2023-02-02 RX ADMIN — Medication 30 MILLICURIE: at 10:02

## 2023-02-02 NOTE — PROGRESS NOTES
At [address] Test was started and completed by Dr. Stella Scott. Joce Yun, nurses and nuclear tech.

## 2024-02-12 ENCOUNTER — TELEPHONE (OUTPATIENT)
Dept: PRIMARY CARE CLINIC | Age: 69
End: 2024-02-12

## 2024-02-12 NOTE — TELEPHONE ENCOUNTER
Information for Provider? Patient would like a call back from the    please. Personal questions.   ---------------------------------------------------------------------------

## 2024-02-13 ENCOUNTER — OFFICE VISIT (OUTPATIENT)
Dept: PRIMARY CARE CLINIC | Age: 69
End: 2024-02-13
Payer: MEDICARE

## 2024-02-13 VITALS
SYSTOLIC BLOOD PRESSURE: 120 MMHG | BODY MASS INDEX: 20.55 KG/M2 | DIASTOLIC BLOOD PRESSURE: 72 MMHG | WEIGHT: 116 LBS | TEMPERATURE: 97.5 F

## 2024-02-13 DIAGNOSIS — M81.0 AGE-RELATED OSTEOPOROSIS WITHOUT CURRENT PATHOLOGICAL FRACTURE: ICD-10-CM

## 2024-02-13 DIAGNOSIS — I25.10 CORONARY ARTERY DISEASE INVOLVING NATIVE CORONARY ARTERY OF NATIVE HEART WITHOUT ANGINA PECTORIS: ICD-10-CM

## 2024-02-13 DIAGNOSIS — E55.9 VITAMIN D DEFICIENCY: ICD-10-CM

## 2024-02-13 DIAGNOSIS — E78.00 PURE HYPERCHOLESTEROLEMIA: ICD-10-CM

## 2024-02-13 DIAGNOSIS — I10 ESSENTIAL HYPERTENSION: Primary | ICD-10-CM

## 2024-02-13 PROCEDURE — 3078F DIAST BP <80 MM HG: CPT | Performed by: FAMILY MEDICINE

## 2024-02-13 PROCEDURE — 1123F ACP DISCUSS/DSCN MKR DOCD: CPT | Performed by: FAMILY MEDICINE

## 2024-02-13 PROCEDURE — 3074F SYST BP LT 130 MM HG: CPT | Performed by: FAMILY MEDICINE

## 2024-02-13 PROCEDURE — 99204 OFFICE O/P NEW MOD 45 MIN: CPT | Performed by: FAMILY MEDICINE

## 2024-02-13 RX ORDER — PANTOPRAZOLE SODIUM 40 MG/10ML
40 INJECTION, POWDER, LYOPHILIZED, FOR SOLUTION INTRAVENOUS DAILY
COMMUNITY

## 2024-02-13 NOTE — PROGRESS NOTES
24     Chantel Arroyo    : 1955 Sex: female   Age: 68 y.o.      Chief Complaint   Patient presents with    New Patient    Tachycardia     Occasionally feels tachy episodes       Prior to Admission medications    Medication Sig Start Date End Date Taking? Authorizing Provider   pantoprazole (PROTONIX) 40 MG injection Infuse 40 mg intravenously daily   Yes Holden Reyes MD   VITAMIN D PO Take by mouth daily    Holden Reyes MD   hydroxychloroquine (PLAQUENIL) 200 MG tablet Take 200 mg by mouth daily    Holden Reyes MD   clopidogrel (PLAVIX) 75 MG tablet TAKE ONE TABLET BY MOUTH DAILY 3/16/20   Duane Pham MD   nitroGLYCERIN (NITROLINGUAL) 0.4 MG/SPRAY 0.4 mg spray Place 1 spray under the tongue every 5 minutes as needed for Chest pain 3/16/20   Duane Pham MD   rosuvastatin (CRESTOR) 20 MG tablet 1 tablet po at hs 3/10/20   Duane Pham MD   NIFEdipine (PROCARDIA XL) 30 MG extended release tablet TAKE ONE TABLET BY MOUTH TWO TIMES A DAY 3/10/20   Duane Pham MD   bisoprolol (ZEBETA) 5 MG tablet TAKE 1/2 TABLET BY MOUTH DAILY 3/2/20   Duane Pham MD   isosorbide mononitrate (IMDUR) 30 MG extended release tablet TAKE ONE TABLET BY MOUTH EVERY DAY 3/2/20   Duane Pham MD   aspirin 81 MG tablet Take 81 mg by mouth daily    ProviderHolden MD          HPI: Chantel is new to me today medical history of hypertensive disease hyperlipidemia vitamin D deficiency osteopenia osteoporosis and coronary artery disease.  She was recommended for Prolia and I encouraged her to go ahead and follow through she is currently seeing Dr. Mayer.  Baseline labs have been recommended and then we will try to give further guidance on current meds and treatment.  Intermittent problems with tachycardia she currently sees Dr. Juan Pablo Serrano we will monitor over the next month obtain labs and then have further discussion with Dr. Juan Pablo Serrano.

## 2024-03-06 DIAGNOSIS — E55.9 VITAMIN D DEFICIENCY: ICD-10-CM

## 2024-03-06 DIAGNOSIS — I25.10 CORONARY ARTERY DISEASE INVOLVING NATIVE CORONARY ARTERY OF NATIVE HEART WITHOUT ANGINA PECTORIS: ICD-10-CM

## 2024-03-06 DIAGNOSIS — I10 ESSENTIAL HYPERTENSION: ICD-10-CM

## 2024-03-06 DIAGNOSIS — E78.00 PURE HYPERCHOLESTEROLEMIA: ICD-10-CM

## 2024-03-07 LAB
ABSOLUTE IMMATURE GRANULOCYTE: <0.03 K/UL (ref 0–0.58)
ALBUMIN SERPL-MCNC: 4.1 G/DL (ref 3.5–5.2)
ALP BLD-CCNC: 95 U/L (ref 35–104)
ALT SERPL-CCNC: 9 U/L (ref 0–32)
ANION GAP SERPL CALCULATED.3IONS-SCNC: 12 MMOL/L (ref 7–16)
AST SERPL-CCNC: 24 U/L (ref 0–31)
BASOPHILS ABSOLUTE: 0.04 K/UL (ref 0–0.2)
BASOPHILS RELATIVE PERCENT: 0 % (ref 0–2)
BILIRUB SERPL-MCNC: 0.3 MG/DL (ref 0–1.2)
BUN BLDV-MCNC: 11 MG/DL (ref 6–23)
CALCIUM SERPL-MCNC: 10 MG/DL (ref 8.6–10.2)
CHLORIDE BLD-SCNC: 101 MMOL/L (ref 98–107)
CHOLESTEROL: 136 MG/DL
CO2: 26 MMOL/L (ref 22–29)
CREAT SERPL-MCNC: 0.7 MG/DL (ref 0.5–1)
EOSINOPHILS ABSOLUTE: 0.12 K/UL (ref 0.05–0.5)
EOSINOPHILS RELATIVE PERCENT: 1 % (ref 0–6)
GFR SERPL CREATININE-BSD FRML MDRD: >60 ML/MIN/1.73M2
GLUCOSE BLD-MCNC: 76 MG/DL (ref 74–99)
HCT VFR BLD CALC: 40.6 % (ref 34–48)
HDLC SERPL-MCNC: 70 MG/DL
HEMOGLOBIN: 12.3 G/DL (ref 11.5–15.5)
IMMATURE GRANULOCYTES: 0 % (ref 0–5)
LDL CHOLESTEROL: 57 MG/DL
LYMPHOCYTES ABSOLUTE: 3.4 K/UL (ref 1.5–4)
LYMPHOCYTES RELATIVE PERCENT: 37 % (ref 20–42)
MCH RBC QN AUTO: 25.8 PG (ref 26–35)
MCHC RBC AUTO-ENTMCNC: 30.3 G/DL (ref 32–34.5)
MCV RBC AUTO: 85.3 FL (ref 80–99.9)
MONOCYTES ABSOLUTE: 0.63 K/UL (ref 0.1–0.95)
MONOCYTES RELATIVE PERCENT: 7 % (ref 2–12)
NEUTROPHILS ABSOLUTE: 5.02 K/UL (ref 1.8–7.3)
NEUTROPHILS RELATIVE PERCENT: 55 % (ref 43–80)
PDW BLD-RTO: 15.5 % (ref 11.5–15)
PLATELET # BLD: 313 K/UL (ref 130–450)
PMV BLD AUTO: 10.4 FL (ref 7–12)
POTASSIUM SERPL-SCNC: 5.3 MMOL/L (ref 3.5–5)
RBC # BLD: 4.76 M/UL (ref 3.5–5.5)
SODIUM BLD-SCNC: 139 MMOL/L (ref 132–146)
T4 TOTAL: 9.4 UG/DL (ref 4.5–11.7)
TOTAL PROTEIN: 7.8 G/DL (ref 6.4–8.3)
TRIGL SERPL-MCNC: 45 MG/DL
TSH SERPL DL<=0.05 MIU/L-ACNC: 1.5 UIU/ML (ref 0.27–4.2)
VITAMIN D 25-HYDROXY: 106.3 NG/ML (ref 30–100)
VLDLC SERPL CALC-MCNC: 9 MG/DL
WBC # BLD: 9.2 K/UL (ref 4.5–11.5)

## 2024-03-12 ENCOUNTER — OFFICE VISIT (OUTPATIENT)
Dept: PRIMARY CARE CLINIC | Age: 69
End: 2024-03-12
Payer: MEDICARE

## 2024-03-12 VITALS
SYSTOLIC BLOOD PRESSURE: 122 MMHG | HEART RATE: 74 BPM | OXYGEN SATURATION: 98 % | BODY MASS INDEX: 20.2 KG/M2 | DIASTOLIC BLOOD PRESSURE: 60 MMHG | WEIGHT: 114 LBS | HEIGHT: 63 IN | TEMPERATURE: 98.3 F

## 2024-03-12 DIAGNOSIS — I73.00 RAYNAUD'S PHENOMENON WITHOUT GANGRENE: ICD-10-CM

## 2024-03-12 DIAGNOSIS — I25.10 CORONARY ARTERY DISEASE INVOLVING NATIVE CORONARY ARTERY WITHOUT ANGINA PECTORIS, UNSPECIFIED WHETHER NATIVE OR TRANSPLANTED HEART: ICD-10-CM

## 2024-03-12 DIAGNOSIS — I10 ESSENTIAL HYPERTENSION: ICD-10-CM

## 2024-03-12 DIAGNOSIS — R00.2 HEART PALPITATIONS: Primary | ICD-10-CM

## 2024-03-12 DIAGNOSIS — E78.5 HYPERLIPIDEMIA, UNSPECIFIED HYPERLIPIDEMIA TYPE: ICD-10-CM

## 2024-03-12 PROCEDURE — 93000 ELECTROCARDIOGRAM COMPLETE: CPT | Performed by: FAMILY MEDICINE

## 2024-03-12 PROCEDURE — 1123F ACP DISCUSS/DSCN MKR DOCD: CPT | Performed by: FAMILY MEDICINE

## 2024-03-12 PROCEDURE — 99214 OFFICE O/P EST MOD 30 MIN: CPT | Performed by: FAMILY MEDICINE

## 2024-03-12 PROCEDURE — 3074F SYST BP LT 130 MM HG: CPT | Performed by: FAMILY MEDICINE

## 2024-03-12 PROCEDURE — 3078F DIAST BP <80 MM HG: CPT | Performed by: FAMILY MEDICINE

## 2024-03-12 RX ORDER — ISOSORBIDE MONONITRATE 30 MG/1
TABLET, EXTENDED RELEASE ORAL
Qty: 90 TABLET | Refills: 1 | Status: SHIPPED | OUTPATIENT
Start: 2024-03-12

## 2024-03-12 RX ORDER — CLOPIDOGREL BISULFATE 75 MG/1
TABLET ORAL
Qty: 90 TABLET | Refills: 1 | Status: SHIPPED | OUTPATIENT
Start: 2024-03-12

## 2024-03-12 RX ORDER — NIFEDIPINE 30 MG/1
TABLET, EXTENDED RELEASE ORAL
Qty: 180 TABLET | Refills: 1 | Status: SHIPPED | OUTPATIENT
Start: 2024-03-12

## 2024-03-12 RX ORDER — ROSUVASTATIN CALCIUM 20 MG/1
TABLET, COATED ORAL
Qty: 90 TABLET | Refills: 1 | Status: SHIPPED | OUTPATIENT
Start: 2024-03-12

## 2024-03-12 RX ORDER — BISOPROLOL FUMARATE 5 MG/1
TABLET, FILM COATED ORAL
Qty: 45 TABLET | Refills: 1 | Status: SHIPPED | OUTPATIENT
Start: 2024-03-12

## 2024-03-12 SDOH — ECONOMIC STABILITY: FOOD INSECURITY: WITHIN THE PAST 12 MONTHS, THE FOOD YOU BOUGHT JUST DIDN'T LAST AND YOU DIDN'T HAVE MONEY TO GET MORE.: NEVER TRUE

## 2024-03-12 SDOH — ECONOMIC STABILITY: FOOD INSECURITY: WITHIN THE PAST 12 MONTHS, YOU WORRIED THAT YOUR FOOD WOULD RUN OUT BEFORE YOU GOT MONEY TO BUY MORE.: NEVER TRUE

## 2024-03-12 SDOH — ECONOMIC STABILITY: HOUSING INSECURITY
IN THE LAST 12 MONTHS, WAS THERE A TIME WHEN YOU DID NOT HAVE A STEADY PLACE TO SLEEP OR SLEPT IN A SHELTER (INCLUDING NOW)?: NO

## 2024-03-12 SDOH — ECONOMIC STABILITY: INCOME INSECURITY: HOW HARD IS IT FOR YOU TO PAY FOR THE VERY BASICS LIKE FOOD, HOUSING, MEDICAL CARE, AND HEATING?: NOT HARD AT ALL

## 2024-03-12 ASSESSMENT — ENCOUNTER SYMPTOMS
EYES NEGATIVE: 1
RESPIRATORY NEGATIVE: 1
ALLERGIC/IMMUNOLOGIC NEGATIVE: 1
GASTROINTESTINAL NEGATIVE: 1

## 2024-03-12 NOTE — PROGRESS NOTES
3/12/24     Chantel Arroyo    : 1955 Sex: female   Age: 68 y.o.      Chief Complaint   Patient presents with    Hypertension    Discuss Labs       Prior to Admission medications    Medication Sig Start Date End Date Taking? Authorizing Provider   rosuvastatin (CRESTOR) 20 MG tablet 1 tablet po at hs 3/12/24  Yes Gonzalez Stanton DO   bisoprolol (ZEBETA) 5 MG tablet TAKE 1/2 TABLET BY MOUTH DAILY 3/12/24  Yes Gonzalez Stanton DO   clopidogrel (PLAVIX) 75 MG tablet TAKE ONE TABLET BY MOUTH DAILY 3/12/24  Yes Gonzalez Stanton DO   isosorbide mononitrate (IMDUR) 30 MG extended release tablet TAKE ONE TABLET BY MOUTH EVERY DAY 3/12/24  Yes Gonzlaez Stanton DO   NIFEdipine (PROCARDIA XL) 30 MG extended release tablet TAKE ONE TABLET BY MOUTH TWO TIMES A DAY 3/12/24  Yes Gonzalez Stanton DO   pantoprazole (PROTONIX) 40 MG injection Infuse 40 mg intravenously daily    ProviderHolden MD   VITAMIN D PO Take by mouth daily    Holden Reyes MD   hydroxychloroquine (PLAQUENIL) 200 MG tablet Take 200 mg by mouth daily    ProviderHolden MD   nitroGLYCERIN (NITROLINGUAL) 0.4 MG/SPRAY 0.4 mg spray Place 1 spray under the tongue every 5 minutes as needed for Chest pain 3/16/20   Duane Pham MD   aspirin 81 MG tablet Take 81 mg by mouth daily    ProviderHolden MD          HPI: Patient seen today with hyperlipidemia hypertension coronary artery disease Raynaud's disease and heart palpitations.  EKG assessed and some PVCs but benign appearance and overall she is managing well so no further workup at this time.  Normally follows with Dr. Juan Pablo Serrano.  Labs reviewed today and clinically well.          Review of Systems   Constitutional: Negative.    HENT: Negative.     Eyes: Negative.    Respiratory: Negative.     Gastrointestinal: Negative.    Endocrine: Negative.    Genitourinary: Negative.    Musculoskeletal: Negative.    Skin: Negative.    Allergic/Immunologic:

## 2024-07-15 DIAGNOSIS — I25.10 CORONARY ARTERY DISEASE INVOLVING NATIVE CORONARY ARTERY WITHOUT ANGINA PECTORIS, UNSPECIFIED WHETHER NATIVE OR TRANSPLANTED HEART: ICD-10-CM

## 2024-07-15 RX ORDER — HYDROXYCHLOROQUINE SULFATE 200 MG/1
200 TABLET, FILM COATED ORAL DAILY
Qty: 90 TABLET | Refills: 3 | Status: SHIPPED | OUTPATIENT
Start: 2024-07-15

## 2024-07-15 RX ORDER — CLOPIDOGREL BISULFATE 75 MG/1
TABLET ORAL
Qty: 90 TABLET | Refills: 1 | Status: SHIPPED | OUTPATIENT
Start: 2024-07-15

## 2024-07-15 RX ORDER — PANTOPRAZOLE SODIUM 40 MG/10ML
40 INJECTION, POWDER, LYOPHILIZED, FOR SOLUTION INTRAVENOUS DAILY
Qty: 90 EACH | Refills: 3 | Status: SHIPPED | OUTPATIENT
Start: 2024-07-15

## 2024-09-10 DIAGNOSIS — E78.5 HYPERLIPIDEMIA, UNSPECIFIED HYPERLIPIDEMIA TYPE: ICD-10-CM

## 2024-09-10 RX ORDER — ROSUVASTATIN CALCIUM 20 MG/1
TABLET, COATED ORAL
Qty: 90 TABLET | Refills: 1 | Status: SHIPPED | OUTPATIENT
Start: 2024-09-10

## 2024-10-28 DIAGNOSIS — I25.10 CORONARY ARTERY DISEASE INVOLVING NATIVE CORONARY ARTERY WITHOUT ANGINA PECTORIS, UNSPECIFIED WHETHER NATIVE OR TRANSPLANTED HEART: ICD-10-CM

## 2024-10-28 DIAGNOSIS — I73.00 RAYNAUD'S PHENOMENON WITHOUT GANGRENE: ICD-10-CM

## 2024-10-28 RX ORDER — CHOLECALCIFEROL (VITAMIN D3) 1250 MCG
CAPSULE ORAL
COMMUNITY
End: 2024-10-28 | Stop reason: SDUPTHER

## 2024-10-28 RX ORDER — NIFEDIPINE 30 MG/1
TABLET, EXTENDED RELEASE ORAL
Qty: 180 TABLET | Refills: 1 | Status: SHIPPED | OUTPATIENT
Start: 2024-10-28

## 2024-10-28 RX ORDER — ISOSORBIDE MONONITRATE 30 MG/1
TABLET, EXTENDED RELEASE ORAL
Qty: 90 TABLET | Refills: 1 | Status: SHIPPED | OUTPATIENT
Start: 2024-10-28

## 2024-10-28 RX ORDER — CHOLECALCIFEROL (VITAMIN D3) 1250 MCG
1 CAPSULE ORAL
Qty: 6 CAPSULE | Refills: 3 | Status: SHIPPED | OUTPATIENT
Start: 2024-10-28

## 2024-11-01 ENCOUNTER — OFFICE VISIT (OUTPATIENT)
Dept: FAMILY MEDICINE CLINIC | Age: 69
End: 2024-11-01

## 2024-11-01 VITALS
HEART RATE: 78 BPM | BODY MASS INDEX: 20.91 KG/M2 | DIASTOLIC BLOOD PRESSURE: 70 MMHG | WEIGHT: 118 LBS | SYSTOLIC BLOOD PRESSURE: 120 MMHG | OXYGEN SATURATION: 96 % | HEIGHT: 63 IN | TEMPERATURE: 98 F

## 2024-11-01 DIAGNOSIS — R00.2 HEART PALPITATIONS: ICD-10-CM

## 2024-11-01 DIAGNOSIS — R05.8 OTHER COUGH: ICD-10-CM

## 2024-11-01 DIAGNOSIS — M79.2 NEURALGIA AND NEURITIS: Primary | ICD-10-CM

## 2024-11-01 PROBLEM — R05.9 COUGH: Status: ACTIVE | Noted: 2024-11-01

## 2024-11-01 RX ORDER — PREDNISONE 5 MG/1
TABLET ORAL
Qty: 30 TABLET | Refills: 0 | Status: SHIPPED | OUTPATIENT
Start: 2024-11-01

## 2024-11-01 RX ORDER — VALACYCLOVIR HYDROCHLORIDE 1 G/1
1000 TABLET, FILM COATED ORAL 3 TIMES DAILY
Qty: 21 TABLET | Refills: 0 | Status: SHIPPED | OUTPATIENT
Start: 2024-11-01 | End: 2024-11-08

## 2024-11-01 ASSESSMENT — ENCOUNTER SYMPTOMS
GASTROINTESTINAL NEGATIVE: 1
EYES NEGATIVE: 1
ALLERGIC/IMMUNOLOGIC NEGATIVE: 1
RESPIRATORY NEGATIVE: 1

## 2024-11-01 NOTE — PROGRESS NOTES
24     Chantel Arroyo    : 1955 Sex: female   Age: 69 y.o.      Chief Complaint   Patient presents with    Itchy Leg       Prior to Admission medications    Medication Sig Start Date End Date Taking? Authorizing Provider   valACYclovir (VALTREX) 1 g tablet Take 1 tablet by mouth 3 times daily for 7 days 24 Yes Gonzalez Stanton DO   predniSONE (DELTASONE) 5 MG tablet 4 tablets daily for 3 days then 3 tablets daily for 3 days then 2 tablets daily for 3 days then 1 tablet daily for 3 days 24  Yes Gonzalez Stanton DO   NIFEdipine (PROCARDIA XL) 30 MG extended release tablet TAKE ONE TABLET BY MOUTH TWO TIMES A DAY 10/28/24  Yes Gonzalez Stanton DO   isosorbide mononitrate (IMDUR) 30 MG extended release tablet TAKE ONE TABLET BY MOUTH EVERY DAY 10/28/24  Yes Gonzalez Stanton DO   Cholecalciferol (VITAMIN D3) 1.25 MG (84231 UT) CAPS Take 1 capsule by mouth every 14 days 10/28/24  Yes Gonzalez Stanton DO   rosuvastatin (CRESTOR) 20 MG tablet 1 tablet po at hs 9/10/24  Yes Gonzalez Stanton DO   pantoprazole (PROTONIX) 40 MG injection Infuse 40 mg intravenously daily 7/15/24  Yes Gonzalez Stanton DO   hydroxychloroquine (PLAQUENIL) 200 MG tablet Take 1 tablet by mouth daily 7/15/24  Yes Gonzalez Stanton DO   clopidogrel (PLAVIX) 75 MG tablet TAKE ONE TABLET BY MOUTH DAILY 7/15/24  Yes Gonzalez Stanton DO   bisoprolol (ZEBETA) 5 MG tablet TAKE 1/2 TABLET BY MOUTH DAILY 3/12/24  Yes Gonzalez Stanton DO   nitroGLYCERIN (NITROLINGUAL) 0.4 MG/SPRAY 0.4 mg spray Place 1 spray under the tongue every 5 minutes as needed for Chest pain 3/16/20  Yes Duane Pham MD   aspirin 81 MG tablet Take 1 tablet by mouth daily   Yes Provider, MD Hloden          HPI: Omayra is seen today problems with skin irritation and nerve root irritation along the left hip and left lateral leg.  Concerns for early onset of possible zoster.  We are going to treat with Valtrex

## 2024-11-15 ENCOUNTER — OFFICE VISIT (OUTPATIENT)
Dept: PRIMARY CARE CLINIC | Age: 69
End: 2024-11-15

## 2024-11-15 VITALS — HEIGHT: 63 IN | BODY MASS INDEX: 20.73 KG/M2 | TEMPERATURE: 97.9 F | WEIGHT: 117 LBS

## 2024-11-15 VITALS
OXYGEN SATURATION: 96 % | HEIGHT: 63 IN | TEMPERATURE: 97.9 F | BODY MASS INDEX: 20.73 KG/M2 | SYSTOLIC BLOOD PRESSURE: 110 MMHG | DIASTOLIC BLOOD PRESSURE: 70 MMHG | WEIGHT: 117 LBS | HEART RATE: 78 BPM

## 2024-11-15 DIAGNOSIS — Z23 NEED FOR PROPHYLACTIC VACCINATION AGAINST DIPHTHERIA-TETANUS-PERTUSSIS (DTP): ICD-10-CM

## 2024-11-15 DIAGNOSIS — Z00.00 INITIAL MEDICARE ANNUAL WELLNESS VISIT: Primary | ICD-10-CM

## 2024-11-15 DIAGNOSIS — I73.00 RAYNAUD'S PHENOMENON WITHOUT GANGRENE: ICD-10-CM

## 2024-11-15 DIAGNOSIS — Z23 NEED FOR PROPHYLACTIC VACCINATION AND INOCULATION AGAINST VARICELLA: ICD-10-CM

## 2024-11-15 DIAGNOSIS — E78.5 HYPERLIPIDEMIA, UNSPECIFIED HYPERLIPIDEMIA TYPE: ICD-10-CM

## 2024-11-15 DIAGNOSIS — I10 ESSENTIAL HYPERTENSION: ICD-10-CM

## 2024-11-15 DIAGNOSIS — Z12.12 SCREENING FOR COLORECTAL CANCER: ICD-10-CM

## 2024-11-15 DIAGNOSIS — I25.10 CORONARY ARTERY DISEASE INVOLVING NATIVE CORONARY ARTERY WITHOUT ANGINA PECTORIS, UNSPECIFIED WHETHER NATIVE OR TRANSPLANTED HEART: ICD-10-CM

## 2024-11-15 DIAGNOSIS — B02.9 HERPES ZOSTER WITHOUT COMPLICATION: ICD-10-CM

## 2024-11-15 DIAGNOSIS — Z72.89 OTHER PROBLEMS RELATED TO LIFESTYLE: ICD-10-CM

## 2024-11-15 DIAGNOSIS — S43.401A SPRAIN OF RIGHT SHOULDER, UNSPECIFIED SHOULDER SPRAIN TYPE, INITIAL ENCOUNTER: Primary | ICD-10-CM

## 2024-11-15 DIAGNOSIS — Z11.59 NEED FOR HEPATITIS C SCREENING TEST: ICD-10-CM

## 2024-11-15 DIAGNOSIS — Z12.11 SCREENING FOR COLORECTAL CANCER: ICD-10-CM

## 2024-11-15 RX ORDER — CLOPIDOGREL BISULFATE 75 MG/1
TABLET ORAL
Qty: 90 TABLET | Refills: 1 | Status: SHIPPED | OUTPATIENT
Start: 2024-11-15

## 2024-11-15 RX ORDER — PANTOPRAZOLE SODIUM 40 MG/10ML
40 INJECTION, POWDER, LYOPHILIZED, FOR SOLUTION INTRAVENOUS DAILY
Qty: 90 EACH | Refills: 3 | Status: SHIPPED | OUTPATIENT
Start: 2024-11-15

## 2024-11-15 RX ORDER — ROSUVASTATIN CALCIUM 20 MG/1
TABLET, COATED ORAL
Qty: 90 TABLET | Refills: 1 | Status: SHIPPED | OUTPATIENT
Start: 2024-11-15

## 2024-11-15 RX ORDER — HYDROXYCHLOROQUINE SULFATE 200 MG/1
200 TABLET, FILM COATED ORAL DAILY
Qty: 90 TABLET | Refills: 3 | Status: SHIPPED | OUTPATIENT
Start: 2024-11-15

## 2024-11-15 RX ORDER — BISOPROLOL FUMARATE 5 MG/1
TABLET, FILM COATED ORAL
Qty: 45 TABLET | Refills: 1 | Status: SHIPPED | OUTPATIENT
Start: 2024-11-15

## 2024-11-15 ASSESSMENT — LIFESTYLE VARIABLES
HOW MANY STANDARD DRINKS CONTAINING ALCOHOL DO YOU HAVE ON A TYPICAL DAY: PATIENT DOES NOT DRINK
HOW OFTEN DO YOU HAVE A DRINK CONTAINING ALCOHOL: NEVER

## 2024-11-15 ASSESSMENT — PATIENT HEALTH QUESTIONNAIRE - PHQ9
SUM OF ALL RESPONSES TO PHQ9 QUESTIONS 1 & 2: 3
7. TROUBLE CONCENTRATING ON THINGS, SUCH AS READING THE NEWSPAPER OR WATCHING TELEVISION: NOT AT ALL
6. FEELING BAD ABOUT YOURSELF - OR THAT YOU ARE A FAILURE OR HAVE LET YOURSELF OR YOUR FAMILY DOWN: NOT AT ALL
5. POOR APPETITE OR OVEREATING: NOT AT ALL
SUM OF ALL RESPONSES TO PHQ QUESTIONS 1-9: 7
SUM OF ALL RESPONSES TO PHQ QUESTIONS 1-9: 7
10. IF YOU CHECKED OFF ANY PROBLEMS, HOW DIFFICULT HAVE THESE PROBLEMS MADE IT FOR YOU TO DO YOUR WORK, TAKE CARE OF THINGS AT HOME, OR GET ALONG WITH OTHER PEOPLE: VERY DIFFICULT
8. MOVING OR SPEAKING SO SLOWLY THAT OTHER PEOPLE COULD HAVE NOTICED. OR THE OPPOSITE, BEING SO FIGETY OR RESTLESS THAT YOU HAVE BEEN MOVING AROUND A LOT MORE THAN USUAL: NOT AT ALL
1. LITTLE INTEREST OR PLEASURE IN DOING THINGS: NOT AT ALL
3. TROUBLE FALLING OR STAYING ASLEEP: MORE THAN HALF THE DAYS
4. FEELING TIRED OR HAVING LITTLE ENERGY: MORE THAN HALF THE DAYS
9. THOUGHTS THAT YOU WOULD BE BETTER OFF DEAD, OR OF HURTING YOURSELF: NOT AT ALL
SUM OF ALL RESPONSES TO PHQ QUESTIONS 1-9: 7
SUM OF ALL RESPONSES TO PHQ QUESTIONS 1-9: 7
2. FEELING DOWN, DEPRESSED OR HOPELESS: NEARLY EVERY DAY

## 2024-11-15 ASSESSMENT — ENCOUNTER SYMPTOMS
EYES NEGATIVE: 1
RESPIRATORY NEGATIVE: 1
GASTROINTESTINAL NEGATIVE: 1
ALLERGIC/IMMUNOLOGIC NEGATIVE: 1

## 2024-11-15 NOTE — PROGRESS NOTES
11/15/24     Chantel Arroyo    : 1955 Sex: female   Age: 69 y.o.      Chief Complaint   Patient presents with    Itchy    Dizziness     Pt states that her left leg is still itchy and she some times has been getting dizzy when she is stressed.       Prior to Admission medications    Medication Sig Start Date End Date Taking? Authorizing Provider   bisoprolol (ZEBETA) 5 MG tablet TAKE 1/2 TABLET BY MOUTH DAILY 11/15/24  Yes Gonzalez Stanton DO   clopidogrel (PLAVIX) 75 MG tablet TAKE ONE TABLET BY MOUTH DAILY 11/15/24  Yes Gonzalez Stanton DO   hydroxychloroquine (PLAQUENIL) 200 MG tablet Take 1 tablet by mouth daily 11/15/24  Yes Gonzalez Stanton DO   pantoprazole (PROTONIX) 40 MG injection Infuse 40 mg intravenously daily 11/15/24  Yes Gonzalez Stanton DO   rosuvastatin (CRESTOR) 20 MG tablet 1 tablet po at hs 11/15/24  Yes Gonzalez Stanton DO   predniSONE (DELTASONE) 5 MG tablet 4 tablets daily for 3 days then 3 tablets daily for 3 days then 2 tablets daily for 3 days then 1 tablet daily for 3 days 24  Yes Gonzalez Stanton DO   NIFEdipine (PROCARDIA XL) 30 MG extended release tablet TAKE ONE TABLET BY MOUTH TWO TIMES A DAY 10/28/24  Yes Gonzalez Stanton DO   isosorbide mononitrate (IMDUR) 30 MG extended release tablet TAKE ONE TABLET BY MOUTH EVERY DAY 10/28/24  Yes Gonzalez Stanton DO   Cholecalciferol (VITAMIN D3) 1.25 MG (74919 UT) CAPS Take 1 capsule by mouth every 14 days 10/28/24  Yes Gonzalez Stanton DO   nitroGLYCERIN (NITROLINGUAL) 0.4 MG/SPRAY 0.4 mg spray Place 1 spray under the tongue every 5 minutes as needed for Chest pain 3/16/20  Yes Duane Pham MD   aspirin 81 MG tablet Take 1 tablet by mouth daily   Yes Holden Reyes MD          HPI: Omayra is seen today in follow-up on hypertension coronary artery disease Raynaud's disease hyperlipidemia today with some right shoulder discomfort follow-up as well on herpes zoster involving left flank

## 2024-11-15 NOTE — PROGRESS NOTES
Medicare Annual Wellness Visit    Chantel Arroyo is here for Medicare AWV    Assessment & Plan   Initial Medicare annual wellness visit  -     FIT-DNA (Cologuard)  -     Tdap (ADACEL) 5-2-15.5 LF-MCG/0.5 injection; Inject 0.5 mLs into the muscle once for 1 dose, Disp-0.5 mL, R-0Print  -     zoster recombinant adjuvanted vaccine (SHINGRIX) 50 MCG/0.5ML SUSR injection; Inject 0.5 mLs into the muscle once for 1 dose, Disp-0.5 mL, R-0Print  -     Hepatitis C Antibody; Future  Need for hepatitis C screening test  -     Hepatitis C Antibody; Future  Need for prophylactic vaccination against diphtheria-tetanus-pertussis (DTP)  -     Tdap (ADACEL) 5-2-15.5 LF-MCG/0.5 injection; Inject 0.5 mLs into the muscle once for 1 dose, Disp-0.5 mL, R-0Print  Need for prophylactic vaccination and inoculation against varicella  -     zoster recombinant adjuvanted vaccine (SHINGRIX) 50 MCG/0.5ML SUSR injection; Inject 0.5 mLs into the muscle once for 1 dose, Disp-0.5 mL, R-0Print  Other problems related to lifestyle  -     Hepatitis C Antibody; Future  Screening for colorectal cancer  -     FIT-DNA (Cologuard)    Recommendations for Preventive Services Due: see orders and patient instructions/AVS.  Recommended screening schedule for the next 5-10 years is provided to the patient in written form: see Patient Instructions/AVS.     Return for Medicare Annual Wellness Visit in 1 year.     Subjective       Patient's complete Health Risk Assessment and screening values have been reviewed and are found in Flowsheets. The following problems were reviewed today and where indicated follow up appointments were made and/or referrals ordered.    Positive Risk Factor Screenings with Interventions:      Depression:  PHQ-2 Score: 3  PHQ-9 Total Score: 7  Total Score Interpretation: 5-9 = mild depression  Interventions:  Grieving process with the recent loss of her son.           Inactivity:  On average, how many days per week do you engage in

## 2024-11-18 ENCOUNTER — TELEPHONE (OUTPATIENT)
Dept: PRIMARY CARE CLINIC | Age: 69
End: 2024-11-18

## 2024-11-18 RX ORDER — PANTOPRAZOLE SODIUM 40 MG/1
40 TABLET, DELAYED RELEASE ORAL
Qty: 30 TABLET | Refills: 1 | Status: SHIPPED | OUTPATIENT
Start: 2024-11-18

## 2024-12-01 PROBLEM — R05.9 COUGH: Status: RESOLVED | Noted: 2024-11-01 | Resolved: 2024-12-01

## 2024-12-23 LAB — NONINV COLON CA DNA+OCC BLD SCRN STL QL: NEGATIVE

## 2025-02-04 ENCOUNTER — TELEPHONE (OUTPATIENT)
Dept: PRIMARY CARE CLINIC | Age: 70
End: 2025-02-04

## 2025-02-04 NOTE — TELEPHONE ENCOUNTER
Patient asking for a call from one you. Specifically asked for [facility] and I advised that he was rooming patients and then she specifically asked for Jenise and I advised that she was phone with insurance company and that I was another MA in the office and I could help and then patient said \"can one of them call me back.\" Would not give anymore information.

## 2025-03-18 ENCOUNTER — OFFICE VISIT (OUTPATIENT)
Dept: PRIMARY CARE CLINIC | Age: 70
End: 2025-03-18
Payer: MEDICARE

## 2025-03-18 ENCOUNTER — RESULTS FOLLOW-UP (OUTPATIENT)
Dept: PRIMARY CARE CLINIC | Age: 70
End: 2025-03-18

## 2025-03-18 VITALS
WEIGHT: 118 LBS | BODY MASS INDEX: 20.91 KG/M2 | DIASTOLIC BLOOD PRESSURE: 80 MMHG | TEMPERATURE: 98 F | SYSTOLIC BLOOD PRESSURE: 124 MMHG | HEIGHT: 63 IN | HEART RATE: 60 BPM

## 2025-03-18 VITALS
HEART RATE: 60 BPM | TEMPERATURE: 98 F | BODY MASS INDEX: 20.91 KG/M2 | OXYGEN SATURATION: 96 % | DIASTOLIC BLOOD PRESSURE: 80 MMHG | SYSTOLIC BLOOD PRESSURE: 124 MMHG | WEIGHT: 118 LBS | HEIGHT: 63 IN

## 2025-03-18 DIAGNOSIS — I25.10 CORONARY ARTERY DISEASE INVOLVING NATIVE CORONARY ARTERY WITHOUT ANGINA PECTORIS, UNSPECIFIED WHETHER NATIVE OR TRANSPLANTED HEART: ICD-10-CM

## 2025-03-18 DIAGNOSIS — E78.5 HYPERLIPIDEMIA, UNSPECIFIED HYPERLIPIDEMIA TYPE: ICD-10-CM

## 2025-03-18 DIAGNOSIS — R55 SYNCOPE, UNSPECIFIED SYNCOPE TYPE: ICD-10-CM

## 2025-03-18 DIAGNOSIS — Z23 NEED FOR PROPHYLACTIC VACCINATION AGAINST DIPHTHERIA-TETANUS-PERTUSSIS (DTP): ICD-10-CM

## 2025-03-18 DIAGNOSIS — G89.29 CHRONIC LEFT SHOULDER PAIN: ICD-10-CM

## 2025-03-18 DIAGNOSIS — I73.00 RAYNAUD'S PHENOMENON WITHOUT GANGRENE: ICD-10-CM

## 2025-03-18 DIAGNOSIS — Z23 NEED FOR PROPHYLACTIC VACCINATION AND INOCULATION AGAINST VARICELLA: ICD-10-CM

## 2025-03-18 DIAGNOSIS — R42 DIZZINESS: ICD-10-CM

## 2025-03-18 DIAGNOSIS — Z00.00 MEDICARE ANNUAL WELLNESS VISIT, SUBSEQUENT: Primary | ICD-10-CM

## 2025-03-18 DIAGNOSIS — I10 ESSENTIAL HYPERTENSION: Primary | ICD-10-CM

## 2025-03-18 DIAGNOSIS — Z23 NEED FOR PROPHYLACTIC VACCINATION AGAINST STREPTOCOCCUS PNEUMONIAE (PNEUMOCOCCUS): ICD-10-CM

## 2025-03-18 DIAGNOSIS — M25.512 CHRONIC LEFT SHOULDER PAIN: ICD-10-CM

## 2025-03-18 PROCEDURE — 3079F DIAST BP 80-89 MM HG: CPT | Performed by: FAMILY MEDICINE

## 2025-03-18 PROCEDURE — 93000 ELECTROCARDIOGRAM COMPLETE: CPT | Performed by: FAMILY MEDICINE

## 2025-03-18 PROCEDURE — G0439 PPPS, SUBSEQ VISIT: HCPCS | Performed by: FAMILY MEDICINE

## 2025-03-18 PROCEDURE — 99214 OFFICE O/P EST MOD 30 MIN: CPT | Performed by: FAMILY MEDICINE

## 2025-03-18 PROCEDURE — 90677 PCV20 VACCINE IM: CPT | Performed by: FAMILY MEDICINE

## 2025-03-18 PROCEDURE — G0009 ADMIN PNEUMOCOCCAL VACCINE: HCPCS | Performed by: FAMILY MEDICINE

## 2025-03-18 PROCEDURE — 3074F SYST BP LT 130 MM HG: CPT | Performed by: FAMILY MEDICINE

## 2025-03-18 PROCEDURE — 1123F ACP DISCUSS/DSCN MKR DOCD: CPT | Performed by: FAMILY MEDICINE

## 2025-03-18 PROCEDURE — 1159F MED LIST DOCD IN RCRD: CPT | Performed by: FAMILY MEDICINE

## 2025-03-18 RX ORDER — EPINEPHRINE 0.3 MG/.3ML
INJECTION SUBCUTANEOUS
Qty: 2 EACH | Refills: 1 | Status: SHIPPED | OUTPATIENT
Start: 2025-03-18

## 2025-03-18 ASSESSMENT — PATIENT HEALTH QUESTIONNAIRE - PHQ9
SUM OF ALL RESPONSES TO PHQ QUESTIONS 1-9: 0
1. LITTLE INTEREST OR PLEASURE IN DOING THINGS: NOT AT ALL
SUM OF ALL RESPONSES TO PHQ QUESTIONS 1-9: 0
2. FEELING DOWN, DEPRESSED OR HOPELESS: NOT AT ALL
SUM OF ALL RESPONSES TO PHQ QUESTIONS 1-9: 0
SUM OF ALL RESPONSES TO PHQ QUESTIONS 1-9: 0

## 2025-03-18 ASSESSMENT — ENCOUNTER SYMPTOMS
RESPIRATORY NEGATIVE: 1
GASTROINTESTINAL NEGATIVE: 1
ALLERGIC/IMMUNOLOGIC NEGATIVE: 1
EYES NEGATIVE: 1

## 2025-03-18 ASSESSMENT — LIFESTYLE VARIABLES
HOW OFTEN DO YOU HAVE A DRINK CONTAINING ALCOHOL: NEVER
HOW MANY STANDARD DRINKS CONTAINING ALCOHOL DO YOU HAVE ON A TYPICAL DAY: PATIENT DOES NOT DRINK

## 2025-03-18 NOTE — PATIENT INSTRUCTIONS
of pain reliever, such as acetaminophen (Tylenol).   When should you call for help?   Call 911 if you have symptoms of a heart attack. These may include:    Chest pain or pressure, or a strange feeling in the chest.     Sweating.     Shortness of breath.     Pain, pressure, or a strange feeling in the back, neck, jaw, or upper belly or in one or both shoulders or arms.     Lightheadedness or sudden weakness.     A fast or irregular heartbeat.   After you call 911, the  may tell you to chew 1 adult-strength or 2 to 4 low-dose aspirin. Wait for an ambulance. Do not try to drive yourself.  Watch closely for changes in your health, and be sure to contact your doctor if you have any problems.  Where can you learn more?  Go to https://www.[employer].net/patientEd and enter F075 to learn more about \"A Healthy Heart: Care Instructions.\"  Current as of: July 31, 2024  Content Version: 14.4  © 4858-1583 [employer].   Care instructions adapted under license by [employer]. If you have questions about a medical condition or this instruction, always ask your healthcare professional. [employer] disclaims any warranty or liability for your use of this information.    Personalized Preventive Plan for Chantel Arroyo - 3/18/2025  Medicare offers a range of preventive health benefits. Some of the tests and screenings are paid in full while other may be subject to a deductible, co-insurance, and/or copay.  Some of these benefits include a comprehensive review of your medical history including lifestyle, illnesses that may run in your family, and various assessments and screenings as appropriate.  After reviewing your medical record and screening and assessments performed today your provider may have ordered immunizations, labs, imaging, and/or referrals for you.  A list of these orders (if applicable) as well as your Preventive Care list are included within your After Visit Summary for your

## 2025-03-18 NOTE — PROGRESS NOTES
Cervical back: Normal range of motion and neck supple.   Skin:     General: Skin is warm and dry.   Neurological:      Mental Status: She is alert and oriented to person, place, and time.   Psychiatric:         Behavior: Behavior normal.     Afebrile blood pressure controlled.  Heart was regular lungs are clear.  EKG no acute changes.  Left bundle branch block is present.  She follows with Dr. Juan Pablo Serrano.  Will check cardiac event monitor and then I will see her in 4 weeks for further discussion.            Plan Per Assessment:  Chantel was seen today for dizziness.    Diagnoses and all orders for this visit:    Essential hypertension    Coronary artery disease involving native coronary artery without angina pectoris, unspecified whether native or transplanted heart    Hyperlipidemia, unspecified hyperlipidemia type    Raynaud's phenomenon without gangrene            No follow-ups on file.      Gonzalez Stanton DO    Note was generated with the assistance of voice recognition software.  Document was reviewed however may contain grammatical errors.

## 2025-03-18 NOTE — PROGRESS NOTES
Gonzalez FLORES DO   nitroGLYCERIN (NITROLINGUAL) 0.4 MG/SPRAY 0.4 mg spray Place 1 spray under the tongue every 5 minutes as needed for Chest pain  Duane Pham MD   aspirin 81 MG tablet Take 1 tablet by mouth daily  Provider, MD Cl Hatch (Including outside providers/suppliers regularly involved in providing care):   Patient Care Team:  Gonzalez Stanton DO as PCP - General (Family Medicine)  Juan Pablo Serrano DO as PCP - Cardiology (Cardiology)  Gonzalez Stanton DO as PCP - Empaneled Provider     Recommendations for Preventive Services Due: see orders and patient instructions/AVS.  Recommended screening schedule for the next 5-10 years is provided to the patient in written form: see Patient Instructions/AVS.     Reviewed and updated this visit:  Allergies  Meds  Sexual Hx

## 2025-03-24 ENCOUNTER — TELEPHONE (OUTPATIENT)
Dept: NON INVASIVE DIAGNOSTICS | Age: 70
End: 2025-03-24

## 2025-03-24 NOTE — TELEPHONE ENCOUNTER
I LEFT A DETAILED MESSAGE FOR PT TO CALL OFFICE TO VERIFY HER ADDRESS AND INSURANCE FOR A [employer] MONITOR TO BE REGISTERED AND MAILED OUT TO HER.

## 2025-04-07 LAB — MAMMOGRAPHY, EXTERNAL: NORMAL

## 2025-04-24 DIAGNOSIS — R55 SYNCOPE, UNSPECIFIED SYNCOPE TYPE: ICD-10-CM

## 2025-04-24 DIAGNOSIS — I73.00 RAYNAUD'S PHENOMENON WITHOUT GANGRENE: ICD-10-CM

## 2025-04-24 DIAGNOSIS — I25.10 CORONARY ARTERY DISEASE INVOLVING NATIVE CORONARY ARTERY WITHOUT ANGINA PECTORIS, UNSPECIFIED WHETHER NATIVE OR TRANSPLANTED HEART: ICD-10-CM

## 2025-04-24 RX ORDER — ISOSORBIDE MONONITRATE 30 MG/1
30 TABLET, EXTENDED RELEASE ORAL DAILY
Qty: 90 TABLET | Refills: 0 | Status: SHIPPED | OUTPATIENT
Start: 2025-04-24

## 2025-04-24 RX ORDER — NIFEDIPINE 30 MG/1
30 TABLET, EXTENDED RELEASE ORAL 2 TIMES DAILY
Qty: 180 TABLET | Refills: 0 | Status: SHIPPED | OUTPATIENT
Start: 2025-04-24

## 2025-05-08 ENCOUNTER — OFFICE VISIT (OUTPATIENT)
Dept: PRIMARY CARE CLINIC | Age: 70
End: 2025-05-08
Payer: MEDICARE

## 2025-05-08 VITALS
HEIGHT: 63 IN | BODY MASS INDEX: 20.73 KG/M2 | SYSTOLIC BLOOD PRESSURE: 124 MMHG | TEMPERATURE: 98.1 F | DIASTOLIC BLOOD PRESSURE: 70 MMHG | HEART RATE: 67 BPM | OXYGEN SATURATION: 98 % | WEIGHT: 117 LBS

## 2025-05-08 DIAGNOSIS — I25.10 CORONARY ARTERY DISEASE INVOLVING NATIVE CORONARY ARTERY WITHOUT ANGINA PECTORIS, UNSPECIFIED WHETHER NATIVE OR TRANSPLANTED HEART: ICD-10-CM

## 2025-05-08 DIAGNOSIS — M34.9 LIMITED SCLERODERMA (HCC): Primary | ICD-10-CM

## 2025-05-08 DIAGNOSIS — E78.00 PURE HYPERCHOLESTEROLEMIA: ICD-10-CM

## 2025-05-08 DIAGNOSIS — I10 ESSENTIAL HYPERTENSION: ICD-10-CM

## 2025-05-08 PROCEDURE — 1123F ACP DISCUSS/DSCN MKR DOCD: CPT | Performed by: FAMILY MEDICINE

## 2025-05-08 PROCEDURE — 3074F SYST BP LT 130 MM HG: CPT | Performed by: FAMILY MEDICINE

## 2025-05-08 PROCEDURE — 99214 OFFICE O/P EST MOD 30 MIN: CPT | Performed by: FAMILY MEDICINE

## 2025-05-08 PROCEDURE — 3078F DIAST BP <80 MM HG: CPT | Performed by: FAMILY MEDICINE

## 2025-05-08 PROCEDURE — 1159F MED LIST DOCD IN RCRD: CPT | Performed by: FAMILY MEDICINE

## 2025-05-08 RX ORDER — CHOLECALCIFEROL (VITAMIN D3) 1250 MCG
1 CAPSULE ORAL
Qty: 6 CAPSULE | Refills: 3 | Status: SHIPPED | OUTPATIENT
Start: 2025-05-08

## 2025-05-08 ASSESSMENT — ENCOUNTER SYMPTOMS
RESPIRATORY NEGATIVE: 1
GASTROINTESTINAL NEGATIVE: 1
EYES NEGATIVE: 1
ALLERGIC/IMMUNOLOGIC NEGATIVE: 1

## 2025-05-08 NOTE — PROGRESS NOTES
25     Chantel Arroyo    : 1955 Sex: female   Age: 70 y.o.      Chief Complaint   Patient presents with    Results    Hypertension       Prior to Admission medications    Medication Sig Start Date End Date Taking? Authorizing Provider   Cholecalciferol (VITAMIN D3) 1.25 MG (42513 UT) CAPS Take 1 capsule by mouth every 14 days 25  Yes Gonzalez Stanton DO   NIFEdipine (PROCARDIA XL) 30 MG extended release tablet TAKE ONE TABLET BY MOUTH TWO TIMES A DAY 25   Gonzalez Stanton DO   isosorbide mononitrate (IMDUR) 30 MG extended release tablet TAKE ONE TABLET BY MOUTH EVERY DAY 25   Gonzalez Stanton DO   EPINEPHrine (EPIPEN 2-DEVORA) 0.3 MG/0.3ML SOAJ injection Use as directed for allergic reaction 3/18/25   Gonzalez Stanton DO   pantoprazole (PROTONIX) 40 MG tablet Take 1 tablet by mouth every morning (before breakfast) 24   Gonzalez Stanton DO   bisoprolol (ZEBETA) 5 MG tablet TAKE 1/2 TABLET BY MOUTH DAILY 11/15/24   Gonzalez Stanton DO   clopidogrel (PLAVIX) 75 MG tablet TAKE ONE TABLET BY MOUTH DAILY 11/15/24   Gonzalez Stanton DO   hydroxychloroquine (PLAQUENIL) 200 MG tablet Take 1 tablet by mouth daily 11/15/24   Gonzalez Stanton DO   rosuvastatin (CRESTOR) 20 MG tablet 1 tablet po at hs 11/15/24   Gonzalez Stanton DO   predniSONE (DELTASONE) 5 MG tablet 4 tablets daily for 3 days then 3 tablets daily for 3 days then 2 tablets daily for 3 days then 1 tablet daily for 3 days 24   Gonzalez Stanton DO   nitroGLYCERIN (NITROLINGUAL) 0.4 MG/SPRAY 0.4 mg spray Place 1 spray under the tongue every 5 minutes as needed for Chest pain 3/16/20   Duane Pham MD   aspirin 81 MG tablet Take 1 tablet by mouth daily    Provider, MD Holden          HPI: Patient seen today hypertension coronary artery disease hyperlipidemia history of scleroderma.  Recent cardiac monitor quite stable.  5 episodes of SVT longest lasting 16 seconds and asymptomatic.  We

## 2025-07-18 DIAGNOSIS — I25.10 CORONARY ARTERY DISEASE INVOLVING NATIVE CORONARY ARTERY WITHOUT ANGINA PECTORIS, UNSPECIFIED WHETHER NATIVE OR TRANSPLANTED HEART: ICD-10-CM

## 2025-07-18 RX ORDER — CLOPIDOGREL BISULFATE 75 MG/1
75 TABLET ORAL DAILY
Qty: 90 TABLET | Refills: 0 | Status: SHIPPED | OUTPATIENT
Start: 2025-07-18

## 2025-07-19 RX ORDER — HYDROXYCHLOROQUINE SULFATE 200 MG/1
200 TABLET, FILM COATED ORAL DAILY
Qty: 90 TABLET | Refills: 1 | Status: SHIPPED | OUTPATIENT
Start: 2025-07-19

## 2025-07-28 RX ORDER — PANTOPRAZOLE SODIUM 40 MG/1
40 TABLET, DELAYED RELEASE ORAL
Qty: 30 TABLET | Refills: 1 | Status: SHIPPED | OUTPATIENT
Start: 2025-07-28

## 2025-07-28 NOTE — TELEPHONE ENCOUNTER
Last Appointment:  5/8/2025  Future Appointments   Date Time Provider Department Center   8/8/2025 11:45 AM Gonzalez Stanton DO [facility]   11/18/2025 10:00 AM Gonzalez Stanton DO [facility]

## 2025-08-07 DIAGNOSIS — I25.10 CORONARY ARTERY DISEASE INVOLVING NATIVE CORONARY ARTERY WITHOUT ANGINA PECTORIS, UNSPECIFIED WHETHER NATIVE OR TRANSPLANTED HEART: ICD-10-CM

## 2025-08-07 RX ORDER — ISOSORBIDE MONONITRATE 30 MG/1
30 TABLET, EXTENDED RELEASE ORAL DAILY
Qty: 90 TABLET | Refills: 0 | Status: SHIPPED | OUTPATIENT
Start: 2025-08-07

## 2025-08-09 DIAGNOSIS — E78.5 HYPERLIPIDEMIA, UNSPECIFIED HYPERLIPIDEMIA TYPE: ICD-10-CM

## 2025-08-10 RX ORDER — ROSUVASTATIN CALCIUM 20 MG/1
20 TABLET, COATED ORAL NIGHTLY
Qty: 90 TABLET | Refills: 0 | Status: SHIPPED | OUTPATIENT
Start: 2025-08-10

## 2025-08-20 DIAGNOSIS — I73.00 RAYNAUD'S PHENOMENON WITHOUT GANGRENE: ICD-10-CM

## 2025-08-20 RX ORDER — NIFEDIPINE 30 MG/1
30 TABLET, EXTENDED RELEASE ORAL 2 TIMES DAILY
Qty: 180 TABLET | Refills: 0 | Status: SHIPPED | OUTPATIENT
Start: 2025-08-20

## 2025-08-28 DIAGNOSIS — I10 ESSENTIAL HYPERTENSION: ICD-10-CM

## 2025-08-28 DIAGNOSIS — I25.10 CORONARY ARTERY DISEASE INVOLVING NATIVE CORONARY ARTERY WITHOUT ANGINA PECTORIS, UNSPECIFIED WHETHER NATIVE OR TRANSPLANTED HEART: ICD-10-CM

## 2025-08-28 DIAGNOSIS — E78.00 PURE HYPERCHOLESTEROLEMIA: ICD-10-CM

## 2025-08-28 LAB
ALBUMIN: 4.2 G/DL (ref 3.5–5.2)
ALP BLD-CCNC: 81 U/L (ref 35–104)
ALT SERPL-CCNC: 11 U/L (ref 0–35)
ANION GAP SERPL CALCULATED.3IONS-SCNC: 13 MMOL/L (ref 7–16)
AST SERPL-CCNC: 31 U/L (ref 0–35)
BASOPHILS ABSOLUTE: 0.04 K/UL (ref 0–0.2)
BASOPHILS RELATIVE PERCENT: 1 % (ref 0–2)
BILIRUB SERPL-MCNC: 0.3 MG/DL (ref 0–1.2)
BUN BLDV-MCNC: 14 MG/DL (ref 8–23)
CALCIUM SERPL-MCNC: 9.8 MG/DL (ref 8.8–10.2)
CHLORIDE BLD-SCNC: 104 MMOL/L (ref 98–107)
CHOLESTEROL, TOTAL: 156 MG/DL
CO2: 25 MMOL/L (ref 22–29)
CREAT SERPL-MCNC: 0.8 MG/DL (ref 0.5–1)
EOSINOPHILS ABSOLUTE: 0.15 K/UL (ref 0.05–0.5)
EOSINOPHILS RELATIVE PERCENT: 2 % (ref 0–6)
GFR, ESTIMATED: 73 ML/MIN/1.73M2
GLUCOSE BLD-MCNC: 81 MG/DL (ref 74–99)
HCT VFR BLD CALC: 39.6 % (ref 34–48)
HDLC SERPL-MCNC: 79 MG/DL
HEMOGLOBIN: 12 G/DL (ref 11.5–15.5)
IMMATURE GRANULOCYTES %: 0 % (ref 0–5)
IMMATURE GRANULOCYTES ABSOLUTE: 0.03 K/UL (ref 0–0.58)
LDL CHOLESTEROL: 65 MG/DL
LYMPHOCYTES ABSOLUTE: 2.79 K/UL (ref 1.5–4)
LYMPHOCYTES RELATIVE PERCENT: 32 % (ref 20–42)
MCH RBC QN AUTO: 27.6 PG (ref 26–35)
MCHC RBC AUTO-ENTMCNC: 30.3 G/DL (ref 32–34.5)
MCV RBC AUTO: 91 FL (ref 80–99.9)
MONOCYTES ABSOLUTE: 0.63 K/UL (ref 0.1–0.95)
MONOCYTES RELATIVE PERCENT: 7 % (ref 2–12)
NEUTROPHILS ABSOLUTE: 5.02 K/UL (ref 1.8–7.3)
NEUTROPHILS RELATIVE PERCENT: 58 % (ref 43–80)
PDW BLD-RTO: 14.6 % (ref 11.5–15)
PLATELET # BLD: 250 K/UL (ref 130–450)
PMV BLD AUTO: 10.1 FL (ref 7–12)
POTASSIUM SERPL-SCNC: 4.3 MMOL/L (ref 3.5–5.1)
RBC # BLD: 4.35 M/UL (ref 3.5–5.5)
SODIUM BLD-SCNC: 142 MMOL/L (ref 136–145)
THYROXINE (T4): 8.1 UG/DL (ref 4.5–11.7)
TOTAL PROTEIN: 7.2 G/DL (ref 6.4–8.3)
TRIGL SERPL-MCNC: 63 MG/DL
TSH SERPL DL<=0.05 MIU/L-ACNC: 2.17 UIU/ML (ref 0.27–4.2)
VLDLC SERPL CALC-MCNC: 13 MG/DL
WBC # BLD: 8.7 K/UL (ref 4.5–11.5)

## 2025-09-03 ENCOUNTER — OFFICE VISIT (OUTPATIENT)
Dept: PRIMARY CARE CLINIC | Age: 70
End: 2025-09-03
Payer: MEDICARE

## 2025-09-03 VITALS
BODY MASS INDEX: 20.73 KG/M2 | DIASTOLIC BLOOD PRESSURE: 60 MMHG | OXYGEN SATURATION: 99 % | HEIGHT: 63 IN | SYSTOLIC BLOOD PRESSURE: 120 MMHG | WEIGHT: 117 LBS | TEMPERATURE: 97.6 F | HEART RATE: 68 BPM

## 2025-09-03 DIAGNOSIS — I25.10 CORONARY ARTERY DISEASE INVOLVING NATIVE CORONARY ARTERY OF NATIVE HEART WITHOUT ANGINA PECTORIS: ICD-10-CM

## 2025-09-03 DIAGNOSIS — G89.29 CHRONIC LEFT SHOULDER PAIN: ICD-10-CM

## 2025-09-03 DIAGNOSIS — I10 ESSENTIAL HYPERTENSION: Primary | ICD-10-CM

## 2025-09-03 DIAGNOSIS — M25.512 CHRONIC LEFT SHOULDER PAIN: ICD-10-CM

## 2025-09-03 DIAGNOSIS — E78.5 HYPERLIPIDEMIA, UNSPECIFIED HYPERLIPIDEMIA TYPE: ICD-10-CM

## 2025-09-03 DIAGNOSIS — I73.00 RAYNAUD'S PHENOMENON WITHOUT GANGRENE: ICD-10-CM

## 2025-09-03 PROCEDURE — 99214 OFFICE O/P EST MOD 30 MIN: CPT | Performed by: FAMILY MEDICINE

## 2025-09-03 PROCEDURE — 1123F ACP DISCUSS/DSCN MKR DOCD: CPT | Performed by: FAMILY MEDICINE

## 2025-09-03 PROCEDURE — 3078F DIAST BP <80 MM HG: CPT | Performed by: FAMILY MEDICINE

## 2025-09-03 PROCEDURE — 1159F MED LIST DOCD IN RCRD: CPT | Performed by: FAMILY MEDICINE

## 2025-09-03 PROCEDURE — 3074F SYST BP LT 130 MM HG: CPT | Performed by: FAMILY MEDICINE

## 2025-09-03 SDOH — ECONOMIC STABILITY: FOOD INSECURITY: WITHIN THE PAST 12 MONTHS, YOU WORRIED THAT YOUR FOOD WOULD RUN OUT BEFORE YOU GOT MONEY TO BUY MORE.: NEVER TRUE

## 2025-09-03 SDOH — ECONOMIC STABILITY: FOOD INSECURITY: WITHIN THE PAST 12 MONTHS, THE FOOD YOU BOUGHT JUST DIDN'T LAST AND YOU DIDN'T HAVE MONEY TO GET MORE.: NEVER TRUE

## 2025-09-03 ASSESSMENT — ENCOUNTER SYMPTOMS
ALLERGIC/IMMUNOLOGIC NEGATIVE: 1
RESPIRATORY NEGATIVE: 1
EYES NEGATIVE: 1
GASTROINTESTINAL NEGATIVE: 1